# Patient Record
Sex: MALE | Race: WHITE | NOT HISPANIC OR LATINO | Employment: FULL TIME | URBAN - NONMETROPOLITAN AREA
[De-identification: names, ages, dates, MRNs, and addresses within clinical notes are randomized per-mention and may not be internally consistent; named-entity substitution may affect disease eponyms.]

---

## 2019-06-25 ENCOUNTER — OFFICE VISIT (OUTPATIENT)
Dept: FAMILY MEDICINE CLINIC | Facility: CLINIC | Age: 34
End: 2019-06-25

## 2019-06-25 VITALS
SYSTOLIC BLOOD PRESSURE: 160 MMHG | HEIGHT: 70 IN | TEMPERATURE: 98.5 F | HEART RATE: 113 BPM | BODY MASS INDEX: 22.39 KG/M2 | OXYGEN SATURATION: 99 % | WEIGHT: 156.4 LBS | DIASTOLIC BLOOD PRESSURE: 90 MMHG

## 2019-06-25 DIAGNOSIS — J30.9 ALLERGIC SHINERS: ICD-10-CM

## 2019-06-25 DIAGNOSIS — L30.9 ECZEMA, UNSPECIFIED TYPE: ICD-10-CM

## 2019-06-25 DIAGNOSIS — K21.9 GASTROESOPHAGEAL REFLUX DISEASE, ESOPHAGITIS PRESENCE NOT SPECIFIED: Primary | ICD-10-CM

## 2019-06-25 PROCEDURE — 99203 OFFICE O/P NEW LOW 30 MIN: CPT | Performed by: FAMILY MEDICINE

## 2019-06-25 RX ORDER — RANITIDINE 150 MG/1
150 TABLET ORAL 2 TIMES DAILY
Qty: 60 TABLET | Refills: 1 | Status: SHIPPED | OUTPATIENT
Start: 2019-06-25 | End: 2019-07-17 | Stop reason: SDUPTHER

## 2019-06-25 NOTE — PROGRESS NOTES
Subjective   Alan Contreras is a 33 y.o. male.   Pt presents today with CC of Establish Care; Difficulty Swallowing; and Heartburn      History of Present Illness   1.  Patient is a 33-year-old male here to establish care.  #2 he complains of daily heartburn with occasional difficulty swallowing.  He states that he feels like his food gets caught in his throat.  This happens roughly once a week, no particular food seems to get caught up, but they are always solid.  He does not take anything for reflux.  He is interested in options.  He reports moderate alcohol use as stated in social history.  He is a current everyday smoker.  His diet is poor.  #3 patient complains of dark rings under his eyes, along with allergies and occasional skin rash, particularly on his feet and legs.         The following portions of the patient's history were reviewed and updated as appropriate: allergies, current medications, past family history, past social history, past surgical history and problem list.    Review of Systems   Constitutional: Negative for chills, fever and unexpected weight loss.   HENT: Negative for congestion and sore throat.    Eyes: Negative for blurred vision and visual disturbance.   Respiratory: Negative for cough and wheezing.    Cardiovascular: Negative for chest pain and palpitations.   Gastrointestinal: Negative for abdominal pain and diarrhea.   Genitourinary: Negative for dysuria.   Musculoskeletal: Negative for arthralgias and neck stiffness.   Neurological: Negative for dizziness, seizures and syncope.   Psychiatric/Behavioral: Negative for self-injury, suicidal ideas and depressed mood.       Objective   Physical Exam   Constitutional: He is oriented to person, place, and time. He appears well-developed and well-nourished.   HENT:   Head: Normocephalic and atraumatic.   Right Ear: External ear normal.   Left Ear: External ear normal.   Nose: Nose normal.   Mouth/Throat: Oropharynx is clear and moist.    Eyes: Conjunctivae and EOM are normal. Pupils are equal, round, and reactive to light.   Neck: Normal range of motion. Neck supple.   Cardiovascular: Normal rate, regular rhythm and normal heart sounds.   Pulmonary/Chest: Effort normal and breath sounds normal.   Abdominal: Soft. Bowel sounds are normal. He exhibits no mass. There is no tenderness. There is no guarding.   Neurological: He is alert and oriented to person, place, and time.   Skin: Skin is warm and dry.   Erythematous rash over shins and ankles, not consistent with irritant dermatitis.  Papular rash in patches with dry skin, consistent with eczema   Psychiatric: He has a normal mood and affect. His behavior is normal.         Assessment/Plan   Alan was seen today for establish care, difficulty swallowing and heartburn.    Diagnoses and all orders for this visit:    Gastroesophageal reflux disease, esophagitis presence not specified  Esophagitis is possible, we will trial Zantac.  She was Zantac as he also has a problem with allergies.  If no significant improvement in 3 weeks I recommended follow-up to consider referral for EGD.  Sooner if this problem worsens.  Eczema, unspecified type  Recommended Eucerin cream.  Will consider steroid infused Eucerin if needed.  Allergic shiners  Dark circles under his eyes, perhaps some degree of sleep deprivation, though consistent with allergic shiners.  Other orders  -     raNITIdine (ZANTAC) 150 MG tablet; Take 1 tablet by mouth 2 (Two) Times a Day.                 I advised Alan of the risks of continuing to use tobacco, and I provided him with tobacco cessation educational materials in the After Visit Summary.     During this visit, I spent 3 minutes counseling the patient regarding tobacco cessation.    Patient's Body mass index is 22.44 kg/m². BMI is above normal parameters. Recommendations include: exercise counseling and nutrition counseling.

## 2019-07-17 ENCOUNTER — OFFICE VISIT (OUTPATIENT)
Dept: FAMILY MEDICINE CLINIC | Facility: CLINIC | Age: 34
End: 2019-07-17

## 2019-07-17 VITALS
BODY MASS INDEX: 22.05 KG/M2 | WEIGHT: 154 LBS | OXYGEN SATURATION: 99 % | DIASTOLIC BLOOD PRESSURE: 88 MMHG | TEMPERATURE: 98.5 F | SYSTOLIC BLOOD PRESSURE: 150 MMHG | HEART RATE: 108 BPM | HEIGHT: 70 IN

## 2019-07-17 DIAGNOSIS — F17.200 CURRENT EVERY DAY SMOKER: ICD-10-CM

## 2019-07-17 DIAGNOSIS — R13.10 DYSPHAGIA, UNSPECIFIED TYPE: ICD-10-CM

## 2019-07-17 DIAGNOSIS — I10 ESSENTIAL HYPERTENSION: ICD-10-CM

## 2019-07-17 DIAGNOSIS — K21.9 GASTROESOPHAGEAL REFLUX DISEASE, ESOPHAGITIS PRESENCE NOT SPECIFIED: Primary | ICD-10-CM

## 2019-07-17 PROCEDURE — 99214 OFFICE O/P EST MOD 30 MIN: CPT | Performed by: FAMILY MEDICINE

## 2019-07-17 PROCEDURE — 80053 COMPREHEN METABOLIC PANEL: CPT | Performed by: FAMILY MEDICINE

## 2019-07-17 PROCEDURE — 85025 COMPLETE CBC W/AUTO DIFF WBC: CPT | Performed by: FAMILY MEDICINE

## 2019-07-17 RX ORDER — RANITIDINE 150 MG/1
150 TABLET ORAL 2 TIMES DAILY
Qty: 60 TABLET | Refills: 1 | Status: SHIPPED | OUTPATIENT
Start: 2019-07-17 | End: 2020-09-02

## 2019-07-17 RX ORDER — OMEPRAZOLE 20 MG/1
20 CAPSULE, DELAYED RELEASE ORAL DAILY
Qty: 30 CAPSULE | Refills: 1 | Status: SHIPPED | OUTPATIENT
Start: 2019-07-17 | End: 2019-09-11 | Stop reason: SDUPTHER

## 2019-07-17 NOTE — PROGRESS NOTES
Subjective   Alan Contreras is a 33 y.o. male.   Pt presents today with CC of Follow-up and Heartburn      History of Present Illness   Patient is a 33-year-old male here to follow-up on heartburn, he also complains of dysphagia.  He states that he feels like food gets caught occasionally, only solids, it eventually goes down without regurgitation.  He states that ranitidine has not significantly helped his symptoms.  He would like further evaluation.  He denies dark tarry stools.  He currently takes ranitidine 150 mg twice a day.  He states that it is not helping significantly.         The following portions of the patient's history were reviewed and updated as appropriate: allergies, current medications, past family history, past social history, past surgical history and problem list.    Review of Systems   Constitutional: Negative for chills, fever and unexpected weight loss.   HENT: Negative for congestion and sore throat.    Eyes: Negative for blurred vision and visual disturbance.   Respiratory: Negative for cough and wheezing.    Cardiovascular: Negative for chest pain and palpitations.   Gastrointestinal: Negative for abdominal pain, blood in stool, constipation and diarrhea.   Endocrine: Negative for cold intolerance and heat intolerance.   Genitourinary: Negative for dysuria.   Musculoskeletal: Negative for arthralgias and neck stiffness.   Neurological: Negative for dizziness, seizures and syncope.   Psychiatric/Behavioral: Negative for self-injury, suicidal ideas and depressed mood.       Objective   Physical Exam   Constitutional: He is oriented to person, place, and time. He appears well-developed and well-nourished.   HENT:   Head: Normocephalic and atraumatic.   Eyes: Conjunctivae and EOM are normal. Pupils are equal, round, and reactive to light.   Neck: Normal range of motion. Neck supple.   Cardiovascular: Normal rate, regular rhythm and normal heart sounds.   Pulmonary/Chest: Effort normal and  breath sounds normal.   Abdominal: Soft. Bowel sounds are normal. He exhibits no distension. There is no tenderness. There is no guarding.   Neurological: He is alert and oriented to person, place, and time.   Skin: Skin is warm and dry.   Psychiatric: He has a normal mood and affect. His behavior is normal.         Assessment/Plan   Alan was seen today for follow-up and heartburn.    Diagnoses and all orders for this visit:    Gastroesophageal reflux disease, esophagitis presence not specified  -     omeprazole (PRILOSEC) 20 MG capsule; Take 1 capsule by mouth Daily.  -     raNITIdine (ZANTAC) 150 MG tablet; Take 1 tablet by mouth 2 (Two) Times a Day.  -     Ambulatory Referral to General Surgery  -     CBC Auto Differential; Future  -     Comprehensive Metabolic Panel; Future  -     CBC Auto Differential  -     Comprehensive Metabolic Panel  Will start omeprazole to go along with ranitidine area and the side effects of these medications were reviewed, he was agreeable to proceed.  Dysphagia, unspecified type  -     Ambulatory Referral to General Surgery  -     CBC Auto Differential; Future  -     Comprehensive Metabolic Panel; Future  -     CBC Auto Differential  -     Comprehensive Metabolic Panel  Recommend discussing his symptoms with his surgeon or GI specialist.  Patient agreeable.  Will get evaluation.    Essential hypertension  His blood pressure has been persistently high.  He states that this is due to anxiety.  Will get a diagnosis and treatment plan for his dysphagia and GERD prior to starting antihypertensives.  Current every day smoker               We discussed smoking cessation for 3 minutes.  He has no interest in quitting.      Patient's Body mass index is 22.1 kg/m². BMI is above normal parameters. Recommendations include: exercise counseling and nutrition counseling.

## 2019-07-18 ENCOUNTER — TELEPHONE (OUTPATIENT)
Dept: FAMILY MEDICINE CLINIC | Facility: CLINIC | Age: 34
End: 2019-07-18

## 2019-07-18 LAB
ALBUMIN SERPL-MCNC: 4.6 G/DL (ref 3.5–5.2)
ALBUMIN/GLOB SERPL: 1.4 G/DL
ALP SERPL-CCNC: 58 U/L (ref 39–117)
ALT SERPL W P-5'-P-CCNC: 24 U/L (ref 1–41)
ANION GAP SERPL CALCULATED.3IONS-SCNC: 12.3 MMOL/L (ref 5–15)
AST SERPL-CCNC: 29 U/L (ref 1–40)
BASOPHILS # BLD AUTO: 0.09 10*3/MM3 (ref 0–0.2)
BASOPHILS NFR BLD AUTO: 1.5 % (ref 0–1.5)
BILIRUB SERPL-MCNC: 0.5 MG/DL (ref 0.2–1.2)
BUN BLD-MCNC: 9 MG/DL (ref 6–20)
BUN/CREAT SERPL: 10.2 (ref 7–25)
CALCIUM SPEC-SCNC: 10 MG/DL (ref 8.6–10.5)
CHLORIDE SERPL-SCNC: 100 MMOL/L (ref 98–107)
CO2 SERPL-SCNC: 26.7 MMOL/L (ref 22–29)
CREAT BLD-MCNC: 0.88 MG/DL (ref 0.76–1.27)
DEPRECATED RDW RBC AUTO: 45.6 FL (ref 37–54)
EOSINOPHIL # BLD AUTO: 0.26 10*3/MM3 (ref 0–0.4)
EOSINOPHIL NFR BLD AUTO: 4.3 % (ref 0.3–6.2)
ERYTHROCYTE [DISTWIDTH] IN BLOOD BY AUTOMATED COUNT: 12.8 % (ref 12.3–15.4)
GFR SERPL CREATININE-BSD FRML MDRD: 99 ML/MIN/1.73
GLOBULIN UR ELPH-MCNC: 3.2 GM/DL
GLUCOSE BLD-MCNC: 89 MG/DL (ref 65–99)
HCT VFR BLD AUTO: 48 % (ref 37.5–51)
HGB BLD-MCNC: 15.7 G/DL (ref 13–17.7)
IMM GRANULOCYTES # BLD AUTO: 0.02 10*3/MM3 (ref 0–0.05)
IMM GRANULOCYTES NFR BLD AUTO: 0.3 % (ref 0–0.5)
LYMPHOCYTES # BLD AUTO: 1.62 10*3/MM3 (ref 0.7–3.1)
LYMPHOCYTES NFR BLD AUTO: 26.7 % (ref 19.6–45.3)
MCH RBC QN AUTO: 31.5 PG (ref 26.6–33)
MCHC RBC AUTO-ENTMCNC: 32.7 G/DL (ref 31.5–35.7)
MCV RBC AUTO: 96.4 FL (ref 79–97)
MONOCYTES # BLD AUTO: 0.49 10*3/MM3 (ref 0.1–0.9)
MONOCYTES NFR BLD AUTO: 8.1 % (ref 5–12)
NEUTROPHILS # BLD AUTO: 3.58 10*3/MM3 (ref 1.7–7)
NEUTROPHILS NFR BLD AUTO: 59.1 % (ref 42.7–76)
NRBC BLD AUTO-RTO: 0 /100 WBC (ref 0–0.2)
PLATELET # BLD AUTO: 244 10*3/MM3 (ref 140–450)
PMV BLD AUTO: 11.8 FL (ref 6–12)
POTASSIUM BLD-SCNC: 5 MMOL/L (ref 3.5–5.2)
PROT SERPL-MCNC: 7.8 G/DL (ref 6–8.5)
RBC # BLD AUTO: 4.98 10*6/MM3 (ref 4.14–5.8)
SODIUM BLD-SCNC: 139 MMOL/L (ref 136–145)
WBC NRBC COR # BLD: 6.06 10*3/MM3 (ref 3.4–10.8)

## 2019-07-18 NOTE — TELEPHONE ENCOUNTER
----- Message from Rayray Telles DO sent at 7/18/2019 11:02 AM EDT -----  Please send a letter.  Labs were all normal.  Please keep your follow-up with general surgery.      Stable letter mailed.

## 2019-07-25 ENCOUNTER — OFFICE VISIT (OUTPATIENT)
Dept: SURGERY | Facility: CLINIC | Age: 34
End: 2019-07-25

## 2019-07-25 VITALS
WEIGHT: 149.2 LBS | SYSTOLIC BLOOD PRESSURE: 146 MMHG | DIASTOLIC BLOOD PRESSURE: 104 MMHG | HEIGHT: 70 IN | BODY MASS INDEX: 21.36 KG/M2

## 2019-07-25 DIAGNOSIS — K21.9 GASTROESOPHAGEAL REFLUX DISEASE, ESOPHAGITIS PRESENCE NOT SPECIFIED: Primary | ICD-10-CM

## 2019-07-25 PROCEDURE — 99203 OFFICE O/P NEW LOW 30 MIN: CPT | Performed by: SURGERY

## 2019-07-25 NOTE — PROGRESS NOTES
Subjective   Alan Contreras is a 33 y.o. male.     History of Present Illness He has had some difficulty swallowing bread and pasta. It seems to catch in his upper esophagus. He has reflux symptoms often and is on prilosec but it has not helped the swallowing. No prior EGD.     The following portions of the patient's history were reviewed and updated as appropriate: current medications, past family history, past medical history, past social history, past surgical history and problem list.    Review of Systems   Constitutional: Negative for activity change, appetite change, chills, fever and unexpected weight change.   HENT: Positive for trouble swallowing. Negative for congestion, facial swelling and sore throat.    Eyes: Negative for photophobia and visual disturbance.   Respiratory: Negative for chest tightness, shortness of breath and wheezing.    Cardiovascular: Negative for chest pain, palpitations and leg swelling.   Gastrointestinal: Negative for abdominal distention, abdominal pain, anal bleeding, blood in stool, constipation, diarrhea, nausea, rectal pain and vomiting.   Endocrine: Negative for cold intolerance, heat intolerance, polydipsia and polyuria.   Genitourinary: Negative for difficulty urinating, dysuria, flank pain and urgency.   Musculoskeletal: Negative for back pain and myalgias.   Skin: Negative for rash and wound.   Allergic/Immunologic: Negative for immunocompromised state.   Neurological: Negative for dizziness, seizures, syncope, light-headedness, numbness and headaches.   Hematological: Negative for adenopathy. Does not bruise/bleed easily.   Psychiatric/Behavioral: Negative for behavioral problems and confusion. The patient is not nervous/anxious.        Objective   Physical Exam   Constitutional: He is oriented to person, place, and time. He appears well-developed and well-nourished. He does not appear ill. No distress.   HENT:   Head: Normocephalic. Head is without laceration. Hair is  normal.   Right Ear: Hearing and ear canal normal.   Left Ear: Hearing and ear canal normal.   Nose: Nose normal. No sinus tenderness. No epistaxis. Right sinus exhibits no maxillary sinus tenderness and no frontal sinus tenderness. Left sinus exhibits no maxillary sinus tenderness and no frontal sinus tenderness.   Eyes: Conjunctivae and lids are normal. Pupils are equal, round, and reactive to light.   Neck: Normal range of motion. No JVD present. No tracheal tenderness present. No tracheal deviation present. No thyroid mass and no thyromegaly present.   Cardiovascular: Normal rate and regular rhythm. Exam reveals no gallop.   No murmur heard.  Pulmonary/Chest: Effort normal and breath sounds normal. No stridor. He has no wheezes. He exhibits no tenderness.   Abdominal: Soft. Bowel sounds are normal. He exhibits no distension, no ascites and no mass. There is no tenderness. There is no rebound and no guarding. No hernia.   Musculoskeletal: He exhibits no edema or deformity.   Lymphadenopathy:     He has no cervical adenopathy.     He has no axillary adenopathy.        Right: No inguinal and no supraclavicular adenopathy present.        Left: No inguinal and no supraclavicular adenopathy present.   Neurological: He is alert and oriented to person, place, and time. He exhibits normal muscle tone.   Skin: Skin is warm, dry and intact. No rash noted. No erythema. No pallor.   Psychiatric: He has a normal mood and affect. His behavior is normal. Thought content normal.   Vitals reviewed.      Assessment/Plan   Alan was seen today for difficulty swallowing.    Diagnoses and all orders for this visit:    Gastroesophageal reflux disease, esophagitis presence not specified    dysphagia, will do EGD and possible dilation.

## 2019-08-08 DIAGNOSIS — K21.9 GASTROESOPHAGEAL REFLUX DISEASE, ESOPHAGITIS PRESENCE NOT SPECIFIED: ICD-10-CM

## 2019-08-08 RX ORDER — OMEPRAZOLE 20 MG/1
CAPSULE, DELAYED RELEASE ORAL
Qty: 30 CAPSULE | Refills: 1 | OUTPATIENT
Start: 2019-08-08

## 2019-08-19 ENCOUNTER — OFFICE VISIT (OUTPATIENT)
Dept: FAMILY MEDICINE CLINIC | Facility: CLINIC | Age: 34
End: 2019-08-19

## 2019-08-19 VITALS
HEIGHT: 70 IN | OXYGEN SATURATION: 98 % | WEIGHT: 151.4 LBS | BODY MASS INDEX: 21.67 KG/M2 | DIASTOLIC BLOOD PRESSURE: 86 MMHG | SYSTOLIC BLOOD PRESSURE: 136 MMHG | TEMPERATURE: 98.7 F | HEART RATE: 106 BPM

## 2019-08-19 DIAGNOSIS — H92.02 LEFT EAR PAIN: ICD-10-CM

## 2019-08-19 DIAGNOSIS — L08.9 INFECTED EPITHELIAL INCLUSION CYST: Primary | ICD-10-CM

## 2019-08-19 DIAGNOSIS — L72.0 INFECTED EPITHELIAL INCLUSION CYST: Primary | ICD-10-CM

## 2019-08-19 PROCEDURE — 96372 THER/PROPH/DIAG INJ SC/IM: CPT | Performed by: FAMILY MEDICINE

## 2019-08-19 PROCEDURE — 99214 OFFICE O/P EST MOD 30 MIN: CPT | Performed by: FAMILY MEDICINE

## 2019-08-19 RX ORDER — CEFTRIAXONE 500 MG/1
1 INJECTION, POWDER, FOR SOLUTION INTRAMUSCULAR; INTRAVENOUS ONCE
Status: COMPLETED | OUTPATIENT
Start: 2019-08-19 | End: 2019-08-19

## 2019-08-19 RX ORDER — CEPHALEXIN 500 MG/1
500 CAPSULE ORAL 2 TIMES DAILY
Qty: 18 CAPSULE | Refills: 0 | Status: SHIPPED | OUTPATIENT
Start: 2019-08-19 | End: 2019-10-04

## 2019-08-19 RX ADMIN — CEFTRIAXONE 1 G: 500 INJECTION, POWDER, FOR SOLUTION INTRAMUSCULAR; INTRAVENOUS at 10:45

## 2019-08-19 NOTE — PROGRESS NOTES
Subjective   Alan Contreras is a 33 y.o. male.   Pt presents today with CC of Earache (left ear)      History of Present Illness   Patient is a 33-year-old male here complaining of left ear pain.  He has a history of dermal inclusion cysts that has historically become infected.  He has 2 large cysts near his left ear, one directly behind, the other anterior to his tragus that is compressing his ear canal.  These have popped up over the past several days.  Historically he has had this problem before, they spontaneously drained many times.  He denies fevers or chills.  He states that he can only hear out of his left ear if he pulls his helix posteriorly to make room.         The following portions of the patient's history were reviewed and updated as appropriate: allergies, current medications, past family history, past social history, past surgical history and problem list.    Review of Systems   Constitutional: Negative for chills, fever and unexpected weight loss.   HENT: Positive for hearing loss. Negative for congestion and sore throat.    Eyes: Negative for blurred vision and visual disturbance.   Respiratory: Negative for cough and wheezing.    Cardiovascular: Negative for chest pain and palpitations.   Gastrointestinal: Negative for abdominal pain and diarrhea.   Endocrine: Negative for cold intolerance and heat intolerance.   Genitourinary: Negative for dysuria.   Musculoskeletal: Negative for arthralgias and neck stiffness.   Skin: Positive for skin lesions.   Neurological: Negative for dizziness, seizures and syncope.   Psychiatric/Behavioral: Negative for self-injury, suicidal ideas and depressed mood.       Objective   Physical Exam   Constitutional: He is oriented to person, place, and time. He appears well-developed and well-nourished.   HENT:   Head: Normocephalic and atraumatic.   Right Ear: External ear normal.   Nose: Nose normal.   Mouth/Throat: Oropharynx is clear and moist.   Anterior to his tragus  is a marble sized moderately tender cyst, it is compressing his ear canal.  A slightly larger cyst is found behind his ear, it is soft, there is no papule on top.  Less tender.  There is moderate erythema around both.  No lymphadenopathy noted.   Eyes: Conjunctivae are normal.   Neck: Normal range of motion. Neck supple. No thyromegaly present.   Cardiovascular: Normal rate and regular rhythm.   Pulmonary/Chest: Effort normal and breath sounds normal.   Lymphadenopathy:     He has no cervical adenopathy.   Neurological: He is alert and oriented to person, place, and time.   Nursing note and vitals reviewed.        Assessment/Plan   Alan was seen today for earache.    Diagnoses and all orders for this visit:    Infected epithelial inclusion cyst  -     cefTRIAXone (ROCEPHIN) injection 1 g  He has no signs of sepsis.  We will treat with 1 g of Rocephin now, he is to start 500 mg twice a day of Keflex starting tomorrow.  Duration 9 days, he has no known history of MRSA.  If his condition worsens we will need to do incision and drainage, at this point the risk outweighs the benefit in my opinion.  He is agreeable to plan.  In the long-term he may benefit from dermatology referral.  NSAIDs as needed for pain.  Left ear pain  -     cephalexin (KEFLEX) 500 MG capsule; Take 1 capsule by mouth 2 (Two) Times a Day. Start 8/20/19

## 2019-08-26 RX ORDER — RANITIDINE 150 MG/1
TABLET ORAL
Qty: 60 TABLET | Refills: 1 | Status: SHIPPED | OUTPATIENT
Start: 2019-08-26 | End: 2019-10-04

## 2019-09-11 DIAGNOSIS — K21.9 GASTROESOPHAGEAL REFLUX DISEASE, ESOPHAGITIS PRESENCE NOT SPECIFIED: ICD-10-CM

## 2019-09-11 RX ORDER — OMEPRAZOLE 20 MG/1
20 CAPSULE, DELAYED RELEASE ORAL DAILY
Qty: 30 CAPSULE | Refills: 1 | Status: SHIPPED | OUTPATIENT
Start: 2019-09-11 | End: 2019-10-20 | Stop reason: SDUPTHER

## 2019-10-04 ENCOUNTER — OFFICE VISIT (OUTPATIENT)
Dept: FAMILY MEDICINE CLINIC | Facility: CLINIC | Age: 34
End: 2019-10-04

## 2019-10-04 VITALS
TEMPERATURE: 98.3 F | HEIGHT: 70 IN | BODY MASS INDEX: 21.7 KG/M2 | OXYGEN SATURATION: 99 % | DIASTOLIC BLOOD PRESSURE: 84 MMHG | HEART RATE: 84 BPM | WEIGHT: 151.6 LBS | SYSTOLIC BLOOD PRESSURE: 122 MMHG

## 2019-10-04 DIAGNOSIS — H92.02 EAR PAIN, LEFT: ICD-10-CM

## 2019-10-04 DIAGNOSIS — L72.0 EIC (EPIDERMAL INCLUSION CYST): Primary | ICD-10-CM

## 2019-10-04 PROCEDURE — 99213 OFFICE O/P EST LOW 20 MIN: CPT | Performed by: FAMILY MEDICINE

## 2019-10-04 RX ORDER — CEPHALEXIN 500 MG/1
500 CAPSULE ORAL 2 TIMES DAILY
Qty: 20 CAPSULE | Refills: 0 | Status: SHIPPED | OUTPATIENT
Start: 2019-10-04 | End: 2020-09-02

## 2019-10-04 NOTE — PROGRESS NOTES
Subjective   Alan Contreras is a 33 y.o. male.   Pt presents today with CC of Earache (cyst in left ear)      History of Present Illness   Patient is a 33-year-old male who is here complaining of a cyst in his left ear.  He was diagnosed in the past with an epidermal inclusion cyst on his left tragus, when this enlarges it partially occludes his ear canal and is uncomfortable.  In the past he did well with antibiotics, it is not been a problem since then.  He reports that yesterday it swelled up and again he had problems with occlusion of his ear canal, today he reports that the little better though would like to pursue treatment.  He requests treatment of the cyst.         The following portions of the patient's history were reviewed and updated as appropriate: allergies, current medications, past family history, past social history, past surgical history and problem list.    Review of Systems   Constitutional: Negative for chills, fever and unexpected weight loss.   HENT: Negative for congestion and sore throat.    Eyes: Negative for blurred vision and visual disturbance.   Respiratory: Negative for cough and wheezing.    Cardiovascular: Negative for chest pain and palpitations.   Gastrointestinal: Negative for abdominal pain and diarrhea.   Genitourinary: Negative for dysuria.   Musculoskeletal: Negative for arthralgias and neck stiffness.   Skin: Positive for skin lesions.   Neurological: Negative for dizziness, seizures and syncope.   Psychiatric/Behavioral: Negative for self-injury, suicidal ideas and depressed mood.       Objective   Physical Exam   Constitutional: He is oriented to person, place, and time. He appears well-developed and well-nourished.   HENT:   Head: Normocephalic and atraumatic.   Left ear tragus is slightly swollen, minimally erythematous, mildly tenderness, cysti noted, approximately size of a pea, it is partially occluding the ear canal, when the skin is  to view the tympanic  membrane, it is clear that adhesion is trying to form superiorly from chronic approximation.  Ear exam was otherwise normal.   Eyes: Conjunctivae and EOM are normal. Pupils are equal, round, and reactive to light.   Neck: Normal range of motion. Neck supple.   Cardiovascular: Normal rate, regular rhythm and normal heart sounds.   Pulmonary/Chest: Effort normal and breath sounds normal.   Abdominal: Soft. Bowel sounds are normal.   Neurological: He is alert and oriented to person, place, and time.   Skin: Skin is warm and dry.   Psychiatric: He has a normal mood and affect. His behavior is normal.   Nursing note and vitals reviewed.        Assessment/Plan   Alan was seen today for earache.    Diagnoses and all orders for this visit:    EIC (epidermal inclusion cyst)  -     Ambulatory Referral to Dermatology  -     cephalexin (KEFLEX) 500 MG capsule; Take 1 capsule by mouth 2 (Two) Times a Day.  We will treat with antibiotic and refer him to dermatology.  The location of the cyst requires specialist treatment.  He is agreeable to plan.  If he has any problems with the antibiotic he is to call.  Ear pain, left  -     cephalexin (KEFLEX) 500 MG capsule; Take 1 capsule by mouth 2 (Two) Times a Day.                 Patient's Body mass index is 21.75 kg/m². BMI is above normal parameters. Recommendations include: exercise counseling and nutrition counseling.

## 2019-10-20 DIAGNOSIS — K21.9 GASTROESOPHAGEAL REFLUX DISEASE, ESOPHAGITIS PRESENCE NOT SPECIFIED: ICD-10-CM

## 2019-10-21 RX ORDER — OMEPRAZOLE 20 MG/1
CAPSULE, DELAYED RELEASE ORAL
Qty: 30 CAPSULE | Refills: 3 | Status: SHIPPED | OUTPATIENT
Start: 2019-10-21 | End: 2020-09-02 | Stop reason: SDUPTHER

## 2020-05-30 DIAGNOSIS — K21.9 GASTROESOPHAGEAL REFLUX DISEASE, ESOPHAGITIS PRESENCE NOT SPECIFIED: ICD-10-CM

## 2020-06-01 RX ORDER — OMEPRAZOLE 20 MG/1
CAPSULE, DELAYED RELEASE ORAL
Qty: 90 CAPSULE | Refills: 1 | OUTPATIENT
Start: 2020-06-01

## 2020-09-02 ENCOUNTER — OFFICE VISIT (OUTPATIENT)
Dept: FAMILY MEDICINE CLINIC | Facility: CLINIC | Age: 35
End: 2020-09-02

## 2020-09-02 VITALS
TEMPERATURE: 97.1 F | HEART RATE: 94 BPM | WEIGHT: 153 LBS | OXYGEN SATURATION: 98 % | HEIGHT: 70 IN | DIASTOLIC BLOOD PRESSURE: 84 MMHG | BODY MASS INDEX: 21.9 KG/M2 | SYSTOLIC BLOOD PRESSURE: 120 MMHG

## 2020-09-02 DIAGNOSIS — K21.9 GASTROESOPHAGEAL REFLUX DISEASE, ESOPHAGITIS PRESENCE NOT SPECIFIED: ICD-10-CM

## 2020-09-02 DIAGNOSIS — F41.9 ANXIETY: Primary | ICD-10-CM

## 2020-09-02 PROCEDURE — 99213 OFFICE O/P EST LOW 20 MIN: CPT | Performed by: FAMILY MEDICINE

## 2020-09-02 RX ORDER — OMEPRAZOLE 20 MG/1
20 CAPSULE, DELAYED RELEASE ORAL DAILY
Qty: 90 CAPSULE | Refills: 1 | Status: SHIPPED | OUTPATIENT
Start: 2020-09-02 | End: 2021-04-13

## 2020-09-02 RX ORDER — HYDROXYZINE HYDROCHLORIDE 25 MG/1
25 TABLET, FILM COATED ORAL EVERY 4 HOURS PRN
Qty: 90 TABLET | Refills: 1 | Status: SHIPPED | OUTPATIENT
Start: 2020-09-02 | End: 2020-09-09

## 2020-09-02 NOTE — PROGRESS NOTES
"Gemma Contreras is a 34 y.o. male.   Pt presents today with CC of Anxiety      History of Present Illness   1.  Patient is here today complaining of anxiety.  He has anxiety on a daily basis.  He reports that lifestyle changes since the beginning of COVID restrictions have worsened his anxiety, including having the kids not going to school and him having to help with that in the evening.  He is not interested in daily medication such as SSRIs as he \"has heard bad things about Prozac\".  He would like to take something as needed.  He is not interested in counseling.  2.  Patient is here to follow-up on GERD.  He takes Prilosec 20 mg as needed.  He reports that he takes it a few days at a time, following a antireflux diet has been helpful.       The following portions of the patient's history were reviewed and updated as appropriate: allergies, current medications, past family history, past medical history, past social history, past surgical history and problem list.    Review of Systems   Constitutional: Negative for chills, fever and unexpected weight loss.   HENT: Negative for congestion and sore throat.    Eyes: Negative for blurred vision and visual disturbance.   Respiratory: Negative for cough and wheezing.    Cardiovascular: Negative for chest pain and palpitations.   Gastrointestinal: Negative for abdominal pain and diarrhea.   Endocrine: Negative for cold intolerance and heat intolerance.   Genitourinary: Negative for dysuria.   Musculoskeletal: Negative for arthralgias and neck stiffness.   Neurological: Negative for dizziness, seizures and syncope.   Psychiatric/Behavioral: Negative for self-injury, suicidal ideas and depressed mood. The patient is nervous/anxious.        Objective   Physical Exam   Constitutional: He is oriented to person, place, and time. He appears well-developed and well-nourished.   HENT:   Head: Normocephalic and atraumatic.   Nose: Nose normal.   Mouth/Throat: Oropharynx is " clear and moist.   Eyes: Pupils are equal, round, and reactive to light. Conjunctivae and EOM are normal.   Cardiovascular: Normal rate, regular rhythm and normal heart sounds.   Pulmonary/Chest: Effort normal and breath sounds normal.   Abdominal: Soft. Bowel sounds are normal.   Neurological: He is alert and oriented to person, place, and time.   Skin: Skin is warm and dry.   Psychiatric: He has a normal mood and affect. His behavior is normal.   Nursing note and vitals reviewed.        Assessment/Plan   Alan was seen today for anxiety.    Diagnoses and all orders for this visit:    Anxiety  -     hydrOXYzine (ATARAX) 25 MG tablet; Take 1 tablet by mouth Every 4 (Four) Hours As Needed for Anxiety.  We discussed options.  Will start hydroxyzine 25 mg every 4 hours, or 50 mg every 8 hours as needed for anxiety.  The side effects of this medication were discussed and he was agreeable to proceed.  We will have him follow-up in 2 months, sooner if needed.  Gastroesophageal reflux disease, esophagitis presence not specified  -     omeprazole (priLOSEC) 20 MG capsule; Take 1 capsule by mouth Daily.  Discontinue Zantac from his medication list because of recalls.  If he has more problems with GERD, we will consider increasing omeprazole to 40 mg.  Current everyday smoker    He has no interest in quitting.         Alan Contreras  reports that he has been smoking cigarettes. He has a 20.00 pack-year smoking history. His smokeless tobacco use includes snuff.. I have educated him on the risk of diseases from using tobacco products such as cancer, COPD and heart diease.     I advised him to quit and he is not willing to quit.    I spent 3  minutes counseling the patient.

## 2020-09-09 DIAGNOSIS — F41.9 ANXIETY: ICD-10-CM

## 2020-09-09 RX ORDER — HYDROXYZINE HYDROCHLORIDE 25 MG/1
25 TABLET, FILM COATED ORAL EVERY 4 HOURS PRN
Qty: 90 TABLET | Refills: 1 | Status: SHIPPED | OUTPATIENT
Start: 2020-09-09 | End: 2020-10-01 | Stop reason: SDUPTHER

## 2020-10-01 DIAGNOSIS — F41.9 ANXIETY: ICD-10-CM

## 2020-10-01 RX ORDER — HYDROXYZINE HYDROCHLORIDE 25 MG/1
25 TABLET, FILM COATED ORAL EVERY 4 HOURS PRN
Qty: 90 TABLET | Refills: 0 | Status: SHIPPED | OUTPATIENT
Start: 2020-10-01 | End: 2022-12-22

## 2020-11-02 ENCOUNTER — OFFICE VISIT (OUTPATIENT)
Dept: FAMILY MEDICINE CLINIC | Facility: CLINIC | Age: 35
End: 2020-11-02

## 2020-11-02 VITALS
SYSTOLIC BLOOD PRESSURE: 130 MMHG | HEART RATE: 107 BPM | BODY MASS INDEX: 22.42 KG/M2 | WEIGHT: 156.6 LBS | TEMPERATURE: 97.6 F | OXYGEN SATURATION: 99 % | HEIGHT: 70 IN | DIASTOLIC BLOOD PRESSURE: 86 MMHG

## 2020-11-02 DIAGNOSIS — R68.82 DECREASED LIBIDO: ICD-10-CM

## 2020-11-02 DIAGNOSIS — Z00.00 ANNUAL PHYSICAL EXAM: Primary | ICD-10-CM

## 2020-11-02 DIAGNOSIS — R53.83 FATIGUE, UNSPECIFIED TYPE: ICD-10-CM

## 2020-11-02 DIAGNOSIS — F41.9 ANXIETY: ICD-10-CM

## 2020-11-02 DIAGNOSIS — Z28.21 INFLUENZA VACCINE REFUSED: ICD-10-CM

## 2020-11-02 PROCEDURE — 99395 PREV VISIT EST AGE 18-39: CPT | Performed by: FAMILY MEDICINE

## 2020-11-02 PROCEDURE — 85025 COMPLETE CBC W/AUTO DIFF WBC: CPT | Performed by: FAMILY MEDICINE

## 2020-11-02 PROCEDURE — 84402 ASSAY OF FREE TESTOSTERONE: CPT | Performed by: FAMILY MEDICINE

## 2020-11-02 PROCEDURE — 80061 LIPID PANEL: CPT | Performed by: FAMILY MEDICINE

## 2020-11-02 PROCEDURE — 80053 COMPREHEN METABOLIC PANEL: CPT | Performed by: FAMILY MEDICINE

## 2020-11-02 PROCEDURE — 84403 ASSAY OF TOTAL TESTOSTERONE: CPT | Performed by: FAMILY MEDICINE

## 2020-11-02 PROCEDURE — 84443 ASSAY THYROID STIM HORMONE: CPT | Performed by: FAMILY MEDICINE

## 2020-11-02 NOTE — PROGRESS NOTES
Gemma Contreras is a 34 y.o. male.   Pt presents today with CC of Anxiety      History of Present Illness   1.  Patient is here for annual physical exam.  #2 he reports fatigue and loss of libido in recent months.  He would like to take something for erectile dysfunction.  He is able to achieve and maintain an erection sometimes, other times not.  He has lost desire for sexual interaction.  He would like to have his labs checked.  He denies chest pain, trauma, or headaches.  #3 he is here today to follow-up on anxiety.  He has been taking hydroxyzine.  He reports this medication only helps a little but he denies side effects.       The following portions of the patient's history were reviewed and updated as appropriate: allergies, current medications, past family history, past medical history, past social history, past surgical history and problem list.    Review of Systems   Constitutional: Positive for fatigue. Negative for chills, fever and unexpected weight loss.   HENT: Negative for congestion and sore throat.    Eyes: Negative for blurred vision and visual disturbance.   Respiratory: Negative for cough and wheezing.    Cardiovascular: Negative for chest pain and palpitations.   Gastrointestinal: Negative for abdominal pain and diarrhea.   Endocrine: Negative for cold intolerance and heat intolerance.   Genitourinary: Positive for decreased libido and erectile dysfunction. Negative for dysuria.   Musculoskeletal: Negative for arthralgias and neck stiffness.   Neurological: Negative for dizziness, seizures and syncope.   Psychiatric/Behavioral: Negative for self-injury, suicidal ideas and depressed mood. The patient is nervous/anxious.        Objective   Physical Exam  Vitals signs and nursing note reviewed.   Constitutional:       Appearance: He is well-developed.   HENT:      Head: Normocephalic and atraumatic.      Right Ear: External ear normal.      Left Ear: External ear normal.      Nose: Nose  normal.   Eyes:      Conjunctiva/sclera: Conjunctivae normal.      Pupils: Pupils are equal, round, and reactive to light.   Neck:      Musculoskeletal: Normal range of motion and neck supple.   Cardiovascular:      Rate and Rhythm: Normal rate and regular rhythm.      Heart sounds: Normal heart sounds.   Pulmonary:      Effort: Pulmonary effort is normal.      Breath sounds: Normal breath sounds.   Abdominal:      General: Bowel sounds are normal.      Palpations: Abdomen is soft.   Genitourinary:     Comments: He preferred not to be examined.  Skin:     General: Skin is warm and dry.   Neurological:      Mental Status: He is alert and oriented to person, place, and time.   Psychiatric:         Behavior: Behavior normal.           Assessment/Plan   Diagnoses and all orders for this visit:    1. Annual physical exam (Primary)  -     Comprehensive Metabolic Panel; Future  -     CBC Auto Differential; Future  -     Lipid Panel; Future  -     TSH; Future  -     Testosterone, Free, Total; Future  -     Comprehensive Metabolic Panel  -     CBC Auto Differential  -     Lipid Panel  -     TSH  -     Testosterone, Free, Total  Patient Counseling:  --Nutrition: Stressed importance of moderation in sodium/caffeine intake, saturated fat and cholesterol, caloric balance, sufficient intake of fresh fruits, vegetables, fiber, calcium, iron, --Discussed the issue of estrogen replacement, calcium supplement, and the daily use of baby aspirin.  --Exercise: Stressed the importance of regular exercise.   --Substance Abuse: Discussed cessation/primary prevention of tobacco, alcohol, or other drug use; driving or other dangerous activities under the influence; availability of treatment for abuse.    --Sexuality: Discussed sexually transmitted diseases, partner selection, use of condoms, avoidance of unintended pregnancy  and contraceptive alternatives.   --Injury prevention: Discussed safety belts, safety helmets, smoke detector,  smoking near bedding or upholstery.   --Dental health: Discussed importance of regular tooth brushing, flossing, and dental visits.  --Immunizations reviewed.  --Discussed benefits of screening colonoscopy.  --After hours service discussed with patient    2. Influenza vaccine refused  He does not want a flu shot today.  3. Fatigue, unspecified type  -     Comprehensive Metabolic Panel; Future  -     CBC Auto Differential; Future  -     Lipid Panel; Future  -     TSH; Future  -     Testosterone, Free, Total; Future  -     Comprehensive Metabolic Panel  -     CBC Auto Differential  -     Lipid Panel  -     TSH  -     Testosterone, Free, Total  Prescription for 50 mg Viagra troches were printed and sent with him.  The side effects of Viagra were discussed including priapism.  He is can go to the emergency room for an erection lasting greater than 4 hours.  The risk of taking this medication along with nitrates were discussed.  He was agreeable to proceed.  I recommend taking half a 50 mg becky for a total of 25 mg to start with to see if it is effective.  He is to continue to use the lowest dose that is effective.  4. Decreased libido  -     TSH; Future  -     Testosterone, Free, Total; Future  -     TSH  -     Testosterone, Free, Total    5. Anxiety    We will continue current treatment.  Follow-up in a few weeks for lab review.             Alan Contreras  reports that he has been smoking cigarettes. He has a 20.00 pack-year smoking history. His smokeless tobacco use includes snuff.. I have educated him on the risk of diseases from using tobacco products such as cancer, COPD and heart disease.     I advised him to quit and he is not willing to quit.    I spent 3  minutes counseling the patient.

## 2020-11-03 LAB
ALBUMIN SERPL-MCNC: 4.3 G/DL (ref 3.5–5.2)
ALBUMIN/GLOB SERPL: 1.6 G/DL
ALP SERPL-CCNC: 62 U/L (ref 39–117)
ALT SERPL W P-5'-P-CCNC: 18 U/L (ref 1–41)
ANION GAP SERPL CALCULATED.3IONS-SCNC: 10.7 MMOL/L (ref 5–15)
AST SERPL-CCNC: 21 U/L (ref 1–40)
BASOPHILS # BLD AUTO: 0.07 10*3/MM3 (ref 0–0.2)
BASOPHILS NFR BLD AUTO: 1 % (ref 0–1.5)
BILIRUB SERPL-MCNC: 0.2 MG/DL (ref 0–1.2)
BUN SERPL-MCNC: 8 MG/DL (ref 6–20)
BUN/CREAT SERPL: 9.8 (ref 7–25)
CALCIUM SPEC-SCNC: 9.1 MG/DL (ref 8.6–10.5)
CHLORIDE SERPL-SCNC: 106 MMOL/L (ref 98–107)
CHOLEST SERPL-MCNC: 111 MG/DL (ref 0–200)
CO2 SERPL-SCNC: 22.3 MMOL/L (ref 22–29)
CREAT SERPL-MCNC: 0.82 MG/DL (ref 0.76–1.27)
DEPRECATED RDW RBC AUTO: 43.2 FL (ref 37–54)
EOSINOPHIL # BLD AUTO: 0.25 10*3/MM3 (ref 0–0.4)
EOSINOPHIL NFR BLD AUTO: 3.7 % (ref 0.3–6.2)
ERYTHROCYTE [DISTWIDTH] IN BLOOD BY AUTOMATED COUNT: 12.8 % (ref 12.3–15.4)
GFR SERPL CREATININE-BSD FRML MDRD: 107 ML/MIN/1.73
GLOBULIN UR ELPH-MCNC: 2.7 GM/DL
GLUCOSE SERPL-MCNC: 83 MG/DL (ref 65–99)
HCT VFR BLD AUTO: 42.8 % (ref 37.5–51)
HDLC SERPL-MCNC: 51 MG/DL (ref 40–60)
HGB BLD-MCNC: 15.1 G/DL (ref 13–17.7)
IMM GRANULOCYTES # BLD AUTO: 0.01 10*3/MM3 (ref 0–0.05)
IMM GRANULOCYTES NFR BLD AUTO: 0.1 % (ref 0–0.5)
LDLC SERPL CALC-MCNC: 40 MG/DL (ref 0–100)
LDLC/HDLC SERPL: 0.76 {RATIO}
LYMPHOCYTES # BLD AUTO: 1.92 10*3/MM3 (ref 0.7–3.1)
LYMPHOCYTES NFR BLD AUTO: 28.4 % (ref 19.6–45.3)
MCH RBC QN AUTO: 32.6 PG (ref 26.6–33)
MCHC RBC AUTO-ENTMCNC: 35.3 G/DL (ref 31.5–35.7)
MCV RBC AUTO: 92.4 FL (ref 79–97)
MONOCYTES # BLD AUTO: 0.42 10*3/MM3 (ref 0.1–0.9)
MONOCYTES NFR BLD AUTO: 6.2 % (ref 5–12)
NEUTROPHILS NFR BLD AUTO: 4.1 10*3/MM3 (ref 1.7–7)
NEUTROPHILS NFR BLD AUTO: 60.6 % (ref 42.7–76)
NRBC BLD AUTO-RTO: 0 /100 WBC (ref 0–0.2)
PLATELET # BLD AUTO: 226 10*3/MM3 (ref 140–450)
PMV BLD AUTO: 11.2 FL (ref 6–12)
POTASSIUM SERPL-SCNC: 3.5 MMOL/L (ref 3.5–5.2)
PROT SERPL-MCNC: 7 G/DL (ref 6–8.5)
RBC # BLD AUTO: 4.63 10*6/MM3 (ref 4.14–5.8)
SODIUM SERPL-SCNC: 139 MMOL/L (ref 136–145)
TESTOST FREE SERPL-MCNC: 30.6 PG/ML (ref 8.7–25.1)
TESTOST SERPL-MCNC: 484 NG/DL (ref 264–916)
TRIGL SERPL-MCNC: 106 MG/DL (ref 0–150)
TSH SERPL DL<=0.05 MIU/L-ACNC: 1.39 UIU/ML (ref 0.27–4.2)
VLDLC SERPL-MCNC: 20 MG/DL (ref 5–40)
WBC # BLD AUTO: 6.77 10*3/MM3 (ref 3.4–10.8)

## 2020-11-04 ENCOUNTER — TELEPHONE (OUTPATIENT)
Dept: FAMILY MEDICINE CLINIC | Facility: CLINIC | Age: 35
End: 2020-11-04

## 2020-11-04 NOTE — TELEPHONE ENCOUNTER
----- Message from Rayray Telles DO sent at 11/4/2020  8:29 AM EST -----  I reviewed your labs.  Though your testosterone was in the low end of the normal range, it is still normal.  Recommend improving diet, and exercising regularly to help with symptoms of fatigue.  The remainder of your labs returned normal.

## 2021-04-13 DIAGNOSIS — K21.9 GASTROESOPHAGEAL REFLUX DISEASE: ICD-10-CM

## 2021-04-13 RX ORDER — OMEPRAZOLE 20 MG/1
CAPSULE, DELAYED RELEASE ORAL
Qty: 90 CAPSULE | Refills: 1 | Status: SHIPPED | OUTPATIENT
Start: 2021-04-13 | End: 2021-10-15

## 2021-10-15 DIAGNOSIS — K21.9 GASTROESOPHAGEAL REFLUX DISEASE: ICD-10-CM

## 2021-10-15 RX ORDER — OMEPRAZOLE 20 MG/1
CAPSULE, DELAYED RELEASE ORAL
Qty: 30 CAPSULE | Refills: 0 | Status: SHIPPED | OUTPATIENT
Start: 2021-10-15 | End: 2022-12-21 | Stop reason: SDUPTHER

## 2021-11-18 DIAGNOSIS — K21.9 GASTROESOPHAGEAL REFLUX DISEASE: ICD-10-CM

## 2021-11-19 RX ORDER — OMEPRAZOLE 20 MG/1
CAPSULE, DELAYED RELEASE ORAL
Qty: 30 CAPSULE | Refills: 0 | OUTPATIENT
Start: 2021-11-19

## 2022-12-16 DIAGNOSIS — K21.9 GASTROESOPHAGEAL REFLUX DISEASE: ICD-10-CM

## 2022-12-20 RX ORDER — OMEPRAZOLE 20 MG/1
CAPSULE, DELAYED RELEASE ORAL
Qty: 30 CAPSULE | Refills: 0 | OUTPATIENT
Start: 2022-12-20

## 2022-12-21 ENCOUNTER — HOSPITAL ENCOUNTER (EMERGENCY)
Facility: HOSPITAL | Age: 37
Discharge: HOME | End: 2022-12-21
Attending: EMERGENCY MEDICINE | Admitting: EMERGENCY MEDICINE

## 2022-12-21 ENCOUNTER — APPOINTMENT (OUTPATIENT)
Dept: CT IMAGING | Facility: HOSPITAL | Age: 37
End: 2022-12-21

## 2022-12-21 ENCOUNTER — APPOINTMENT (OUTPATIENT)
Dept: GENERAL RADIOLOGY | Facility: HOSPITAL | Age: 37
End: 2022-12-21

## 2022-12-21 ENCOUNTER — APPOINTMENT (OUTPATIENT)
Dept: ULTRASOUND IMAGING | Facility: HOSPITAL | Age: 37
End: 2022-12-21

## 2022-12-21 VITALS
WEIGHT: 165 LBS | RESPIRATION RATE: 18 BRPM | TEMPERATURE: 98.7 F | BODY MASS INDEX: 23.62 KG/M2 | SYSTOLIC BLOOD PRESSURE: 138 MMHG | OXYGEN SATURATION: 100 % | HEART RATE: 67 BPM | HEIGHT: 70 IN | DIASTOLIC BLOOD PRESSURE: 105 MMHG

## 2022-12-21 DIAGNOSIS — R10.13 EPIGASTRIC PAIN: Primary | ICD-10-CM

## 2022-12-21 DIAGNOSIS — K21.9 GASTROESOPHAGEAL REFLUX DISEASE: ICD-10-CM

## 2022-12-21 LAB
ALBUMIN SERPL-MCNC: 4.48 G/DL (ref 3.5–5.2)
ALBUMIN/GLOB SERPL: 1.3 G/DL
ALP SERPL-CCNC: 85 U/L (ref 39–117)
ALT SERPL W P-5'-P-CCNC: 49 U/L (ref 1–41)
AMPHET+METHAMPHET UR QL: NEGATIVE
AMPHETAMINES UR QL: NEGATIVE
AMYLASE SERPL-CCNC: 64 U/L (ref 28–100)
ANION GAP SERPL CALCULATED.3IONS-SCNC: 8.7 MMOL/L (ref 5–15)
AST SERPL-CCNC: 29 U/L (ref 1–40)
BARBITURATES UR QL SCN: NEGATIVE
BASOPHILS # BLD AUTO: 0.1 10*3/MM3 (ref 0–0.2)
BASOPHILS NFR BLD AUTO: 0.9 % (ref 0–1.5)
BENZODIAZ UR QL SCN: NEGATIVE
BILIRUB SERPL-MCNC: 0.6 MG/DL (ref 0–1.2)
BILIRUB UR QL STRIP: NEGATIVE
BUN SERPL-MCNC: 8 MG/DL (ref 6–20)
BUN/CREAT SERPL: 8.1 (ref 7–25)
BUPRENORPHINE SERPL-MCNC: NEGATIVE NG/ML
CALCIUM SPEC-SCNC: 9.9 MG/DL (ref 8.6–10.5)
CANNABINOIDS SERPL QL: NEGATIVE
CHLORIDE SERPL-SCNC: 103 MMOL/L (ref 98–107)
CLARITY UR: CLEAR
CO2 SERPL-SCNC: 29.3 MMOL/L (ref 22–29)
COCAINE UR QL: NEGATIVE
COLOR UR: YELLOW
CREAT SERPL-MCNC: 0.99 MG/DL (ref 0.76–1.27)
CRP SERPL-MCNC: 1.21 MG/DL (ref 0–0.5)
D-LACTATE SERPL-SCNC: 2 MMOL/L (ref 0.5–2)
DEPRECATED RDW RBC AUTO: 41.1 FL (ref 37–54)
EGFRCR SERPLBLD CKD-EPI 2021: 100 ML/MIN/1.73
EOSINOPHIL # BLD AUTO: 0.18 10*3/MM3 (ref 0–0.4)
EOSINOPHIL NFR BLD AUTO: 1.6 % (ref 0.3–6.2)
ERYTHROCYTE [DISTWIDTH] IN BLOOD BY AUTOMATED COUNT: 11.9 % (ref 12.3–15.4)
ETHANOL BLD-MCNC: 10 MG/DL (ref 0–10)
ETHANOL UR QL: 0.01 %
FLUAV RNA RESP QL NAA+PROBE: NOT DETECTED
FLUBV RNA RESP QL NAA+PROBE: NOT DETECTED
GLOBULIN UR ELPH-MCNC: 3.3 GM/DL
GLUCOSE SERPL-MCNC: 105 MG/DL (ref 65–99)
GLUCOSE UR STRIP-MCNC: NEGATIVE MG/DL
HCT VFR BLD AUTO: 47.8 % (ref 37.5–51)
HGB BLD-MCNC: 16.9 G/DL (ref 13–17.7)
HGB UR QL STRIP.AUTO: NEGATIVE
HOLD SPECIMEN: NORMAL
HOLD SPECIMEN: NORMAL
IMM GRANULOCYTES # BLD AUTO: 0.04 10*3/MM3 (ref 0–0.05)
IMM GRANULOCYTES NFR BLD AUTO: 0.4 % (ref 0–0.5)
KETONES UR QL STRIP: NEGATIVE
LEUKOCYTE ESTERASE UR QL STRIP.AUTO: NEGATIVE
LIPASE SERPL-CCNC: 32 U/L (ref 13–60)
LYMPHOCYTES # BLD AUTO: 2.39 10*3/MM3 (ref 0.7–3.1)
LYMPHOCYTES NFR BLD AUTO: 20.9 % (ref 19.6–45.3)
MAGNESIUM SERPL-MCNC: 2.2 MG/DL (ref 1.6–2.6)
MCH RBC QN AUTO: 33.1 PG (ref 26.6–33)
MCHC RBC AUTO-ENTMCNC: 35.4 G/DL (ref 31.5–35.7)
MCV RBC AUTO: 93.5 FL (ref 79–97)
METHADONE UR QL SCN: NEGATIVE
MONOCYTES # BLD AUTO: 0.69 10*3/MM3 (ref 0.1–0.9)
MONOCYTES NFR BLD AUTO: 6 % (ref 5–12)
NEUTROPHILS NFR BLD AUTO: 70.2 % (ref 42.7–76)
NEUTROPHILS NFR BLD AUTO: 8.01 10*3/MM3 (ref 1.7–7)
NITRITE UR QL STRIP: NEGATIVE
NRBC BLD AUTO-RTO: 0 /100 WBC (ref 0–0.2)
OPIATES UR QL: NEGATIVE
OXYCODONE UR QL SCN: NEGATIVE
PCP UR QL SCN: NEGATIVE
PH UR STRIP.AUTO: 7.5 [PH] (ref 5–8)
PLATELET # BLD AUTO: 288 10*3/MM3 (ref 140–450)
PMV BLD AUTO: 10.2 FL (ref 6–12)
POTASSIUM SERPL-SCNC: 4.1 MMOL/L (ref 3.5–5.2)
PROPOXYPH UR QL: NEGATIVE
PROT SERPL-MCNC: 7.8 G/DL (ref 6–8.5)
PROT UR QL STRIP: NEGATIVE
RBC # BLD AUTO: 5.11 10*6/MM3 (ref 4.14–5.8)
SARS-COV-2 RNA RESP QL NAA+PROBE: NOT DETECTED
SODIUM SERPL-SCNC: 141 MMOL/L (ref 136–145)
SP GR UR STRIP: 1.01 (ref 1–1.03)
TRICYCLICS UR QL SCN: NEGATIVE
TROPONIN T SERPL-MCNC: <0.01 NG/ML (ref 0–0.03)
TROPONIN T SERPL-MCNC: <0.01 NG/ML (ref 0–0.03)
UROBILINOGEN UR QL STRIP: NORMAL
WBC NRBC COR # BLD: 11.41 10*3/MM3 (ref 3.4–10.8)
WHOLE BLOOD HOLD COAG: NORMAL
WHOLE BLOOD HOLD SPECIMEN: NORMAL

## 2022-12-21 PROCEDURE — 96374 THER/PROPH/DIAG INJ IV PUSH: CPT

## 2022-12-21 PROCEDURE — 84484 ASSAY OF TROPONIN QUANT: CPT | Performed by: EMERGENCY MEDICINE

## 2022-12-21 PROCEDURE — 85025 COMPLETE CBC W/AUTO DIFF WBC: CPT | Performed by: EMERGENCY MEDICINE

## 2022-12-21 PROCEDURE — 25010000002 IOPAMIDOL 61 % SOLUTION: Performed by: EMERGENCY MEDICINE

## 2022-12-21 PROCEDURE — 82077 ASSAY SPEC XCP UR&BREATH IA: CPT | Performed by: EMERGENCY MEDICINE

## 2022-12-21 PROCEDURE — 87636 SARSCOV2 & INF A&B AMP PRB: CPT | Performed by: PHYSICIAN ASSISTANT

## 2022-12-21 PROCEDURE — 25010000002 ONDANSETRON PER 1 MG: Performed by: PHYSICIAN ASSISTANT

## 2022-12-21 PROCEDURE — 99284 EMERGENCY DEPT VISIT MOD MDM: CPT

## 2022-12-21 PROCEDURE — 83605 ASSAY OF LACTIC ACID: CPT | Performed by: PHYSICIAN ASSISTANT

## 2022-12-21 PROCEDURE — 36415 COLL VENOUS BLD VENIPUNCTURE: CPT

## 2022-12-21 PROCEDURE — 76705 ECHO EXAM OF ABDOMEN: CPT

## 2022-12-21 PROCEDURE — 80306 DRUG TEST PRSMV INSTRMNT: CPT | Performed by: PHYSICIAN ASSISTANT

## 2022-12-21 PROCEDURE — 86140 C-REACTIVE PROTEIN: CPT | Performed by: EMERGENCY MEDICINE

## 2022-12-21 PROCEDURE — 80053 COMPREHEN METABOLIC PANEL: CPT | Performed by: EMERGENCY MEDICINE

## 2022-12-21 PROCEDURE — 81003 URINALYSIS AUTO W/O SCOPE: CPT | Performed by: PHYSICIAN ASSISTANT

## 2022-12-21 PROCEDURE — 76705 ECHO EXAM OF ABDOMEN: CPT | Performed by: RADIOLOGY

## 2022-12-21 PROCEDURE — 93010 ELECTROCARDIOGRAM REPORT: CPT | Performed by: INTERNAL MEDICINE

## 2022-12-21 PROCEDURE — 74177 CT ABD & PELVIS W/CONTRAST: CPT

## 2022-12-21 PROCEDURE — 84484 ASSAY OF TROPONIN QUANT: CPT | Performed by: PHYSICIAN ASSISTANT

## 2022-12-21 PROCEDURE — 93005 ELECTROCARDIOGRAM TRACING: CPT | Performed by: PHYSICIAN ASSISTANT

## 2022-12-21 PROCEDURE — 83690 ASSAY OF LIPASE: CPT | Performed by: EMERGENCY MEDICINE

## 2022-12-21 PROCEDURE — 83735 ASSAY OF MAGNESIUM: CPT | Performed by: EMERGENCY MEDICINE

## 2022-12-21 PROCEDURE — 96375 TX/PRO/DX INJ NEW DRUG ADDON: CPT

## 2022-12-21 PROCEDURE — 82150 ASSAY OF AMYLASE: CPT | Performed by: EMERGENCY MEDICINE

## 2022-12-21 PROCEDURE — 71045 X-RAY EXAM CHEST 1 VIEW: CPT | Performed by: RADIOLOGY

## 2022-12-21 PROCEDURE — 71045 X-RAY EXAM CHEST 1 VIEW: CPT

## 2022-12-21 PROCEDURE — 74177 CT ABD & PELVIS W/CONTRAST: CPT | Performed by: RADIOLOGY

## 2022-12-21 RX ORDER — SODIUM CHLORIDE 0.9 % (FLUSH) 0.9 %
10 SYRINGE (ML) INJECTION AS NEEDED
Status: DISCONTINUED | OUTPATIENT
Start: 2022-12-21 | End: 2022-12-21 | Stop reason: HOSPADM

## 2022-12-21 RX ORDER — ALUMINA, MAGNESIA, AND SIMETHICONE 2400; 2400; 240 MG/30ML; MG/30ML; MG/30ML
15 SUSPENSION ORAL ONCE
Status: COMPLETED | OUTPATIENT
Start: 2022-12-21 | End: 2022-12-21

## 2022-12-21 RX ORDER — FAMOTIDINE 10 MG/ML
20 INJECTION, SOLUTION INTRAVENOUS ONCE
Status: COMPLETED | OUTPATIENT
Start: 2022-12-21 | End: 2022-12-21

## 2022-12-21 RX ORDER — ONDANSETRON 2 MG/ML
4 INJECTION INTRAMUSCULAR; INTRAVENOUS ONCE
Status: COMPLETED | OUTPATIENT
Start: 2022-12-21 | End: 2022-12-21

## 2022-12-21 RX ORDER — OMEPRAZOLE 20 MG/1
20 CAPSULE, DELAYED RELEASE ORAL DAILY
Qty: 30 CAPSULE | Refills: 0 | Status: SHIPPED | OUTPATIENT
Start: 2022-12-21 | End: 2023-01-04 | Stop reason: HOSPADM

## 2022-12-21 RX ORDER — LIDOCAINE HYDROCHLORIDE 20 MG/ML
15 SOLUTION OROPHARYNGEAL ONCE
Status: COMPLETED | OUTPATIENT
Start: 2022-12-21 | End: 2022-12-21

## 2022-12-21 RX ADMIN — ALUMINUM HYDROXIDE, MAGNESIUM HYDROXIDE, AND DIMETHICONE 15 ML: 400; 400; 40 SUSPENSION ORAL at 11:25

## 2022-12-21 RX ADMIN — ONDANSETRON 4 MG: 2 INJECTION INTRAMUSCULAR; INTRAVENOUS at 10:56

## 2022-12-21 RX ADMIN — LIDOCAINE HYDROCHLORIDE 15 ML: 20 SOLUTION ORAL; TOPICAL at 11:25

## 2022-12-21 RX ADMIN — FAMOTIDINE 20 MG: 10 INJECTION INTRAVENOUS at 11:25

## 2022-12-21 RX ADMIN — IOPAMIDOL 85 ML: 612 INJECTION, SOLUTION INTRAVENOUS at 12:43

## 2022-12-21 RX ADMIN — SODIUM CHLORIDE 1000 ML: 9 INJECTION, SOLUTION INTRAVENOUS at 10:57

## 2022-12-21 NOTE — ED PROVIDER NOTES
Subjective   History of Present Illness  37-year-old male presents to the ED today for epigastric pain.  He states this started yesterday around 11 AM.  He states initially it was constant yesterday but today it is more intermittent.  He states currently he is not having any pain but when the pain hits it is quite severe.  He states the pain radiates into his back and up into his chest.  He states he had a lot of dry heaves yesterday.  He denies any diarrhea.  He denies any urinary symptoms.  He denies any fever.  He states it feels like everything he swallows gets stuck in his lower esophagus.  He does have a history of GERD.  He takes Prilosec as needed.  He states he did take Prilosec yesterday with no relief of his symptoms.  He states he also tried Tums, drinking buttermilk and apple cider vinegar.  He went to urgent care today and was sent here for further evaluation.  He states he is not had any surgeries and denies any other medical problems.  He states he does smoke a pack of cigarettes a day.  He states he drinks 4 or 5 beers daily.  His last drink was about 2 days ago.  He denies any past history of pancreatitis or gallbladder issues.    History provided by:  Patient  Abdominal Pain  Pain location:  Epigastric  Pain quality: sharp    Pain radiates to:  Back  Pain severity:  Severe  Onset quality:  Gradual  Duration:  1 day  Timing:  Intermittent  Progression:  Waxing and waning  Chronicity:  New  Context: alcohol use    Context: not trauma    Relieved by:  Nothing  Worsened by:  Nothing  Ineffective treatments:  OTC medications and antacids  Associated symptoms: chest pain and nausea    Associated symptoms: no anorexia, no chills, no constipation, no cough, no diarrhea, no dysuria, no fatigue, no fever, no hematemesis, no hematochezia, no hematuria, no melena, no shortness of breath and no vomiting        Review of Systems   Constitutional: Positive for appetite change. Negative for chills, fatigue and  fever.   HENT: Negative.    Eyes: Negative.    Respiratory: Negative for cough and shortness of breath.    Cardiovascular: Positive for chest pain.   Gastrointestinal: Positive for abdominal pain and nausea. Negative for anorexia, constipation, diarrhea, hematemesis, hematochezia, melena and vomiting.   Genitourinary: Negative for dysuria and hematuria.   Musculoskeletal: Positive for back pain.   Skin: Negative.    Neurological: Negative.    Psychiatric/Behavioral: Negative.    All other systems reviewed and are negative.      No past medical history on file.    No Known Allergies    No past surgical history on file.    Family History   Problem Relation Age of Onset   • Bipolar disorder Mother    • Schizophrenia Mother    • Depression Mother    • Kidney disease Father    • Stroke Paternal Aunt    • Schizophrenia Maternal Grandfather    • Bipolar disorder Maternal Grandfather    • Dementia Paternal Grandmother        Social History     Socioeconomic History   • Marital status: Single   Tobacco Use   • Smoking status: Every Day     Packs/day: 1.00     Years: 20.00     Pack years: 20.00     Types: Cigarettes   • Smokeless tobacco: Current     Types: Snuff   Substance and Sexual Activity   • Alcohol use: Yes     Alcohol/week: 18.0 standard drinks     Types: 18 Cans of beer per week   • Drug use: No   • Sexual activity: Defer           Objective   Physical Exam  Vitals and nursing note reviewed.   Constitutional:       General: He is not in acute distress.     Appearance: He is well-developed. He is not diaphoretic.   HENT:      Head: Normocephalic and atraumatic.   Eyes:      Extraocular Movements: Extraocular movements intact.      Pupils: Pupils are equal, round, and reactive to light.   Cardiovascular:      Rate and Rhythm: Normal rate and regular rhythm.      Heart sounds: Normal heart sounds.   Pulmonary:      Effort: Pulmonary effort is normal.      Breath sounds: Normal breath sounds. No wheezing or rhonchi.    Chest:      Chest wall: No tenderness.   Abdominal:      General: Bowel sounds are normal.      Palpations: Abdomen is soft.      Tenderness: There is abdominal tenderness (no evidence of an acute surgical abdomen) in the epigastric area. There is no right CVA tenderness, left CVA tenderness, guarding or rebound.   Skin:     General: Skin is warm and dry.      Capillary Refill: Capillary refill takes less than 2 seconds.   Neurological:      General: No focal deficit present.      Mental Status: He is alert and oriented to person, place, and time.   Psychiatric:         Mood and Affect: Mood normal.         Procedures           ED Course  ED Course as of 12/21/22 1430   Wed Dec 21, 2022   1115 US Abdomen Limited  FINDINGS:    Liver:  Unremarkable.  No mass.  No intrahepatic bile duct dilation.    Gallbladder:  Gallbladder wall is within normal limits.  No shadowing  gallstones identified.    Common bile duct:  Common bile duct is 3 mm diameter.  No stones.  No  dilation.    Pancreas:  Unremarkable as visualized.     IMPRESSION:    Unremarkable exam. No acute findings sonographically evident. [AH]   1140 XR Chest 1 View  FINDINGS:    Lungs:  Unremarkable.  No consolidation.    Pleural space:  Unremarkable.  No pneumothorax.    Heart:  Unremarkable.  No cardiomegaly.    Mediastinum:  Unremarkable.    Bones/joints:  Unremarkable.     IMPRESSION:    Unremarkable exam. No acute cardiopulmonary findings identified. [AH]   1150 ECG 12 Lead Other; epigastric pain  Vent. Rate :  83 BPM     Atrial Rate :  83 BPM     P-R Int : 144 ms          QRS Dur :  76 ms      QT Int : 366 ms       P-R-T Axes :  53  22  51 degrees     QTc Int : 430 ms     Normal sinus rhythm with sinus arrhythmia  Possible Left atrial enlargement  Septal infarct , age undetermined  Abnormal ECG  No previous ECGs available [ES]   1346 CT Abdomen Pelvis With Contrast  IMPRESSION:  1.  Dilated fluid-filled esophagus to the level of GE junction  suspicious  for achalasia. Upper endoscopy may be indicated to further  evaluate.  2.  Mild diffuse fatty infiltration of liver.  3. Other nonacute findings as above. [AH]   5010 I discussed the CT findings with Dr. Lyons.  She advised the patient could follow-up outpatient with the GI clinic and be scheduled for an EGD.  She states the patient may require dilatation of the esophagus.  I have discussed all this with the patient and he is agreeable with the plan.  He states he is currently out of his Prilosec so a new prescription has been provided to him.  He was advised to take this daily.  He is currently tolerating p.o. fluids in the ED.  He was advised to return to the ED anytime for symptoms change or worsen.  He was also advised to follow-up with his PCP in the office as his blood pressure was elevated in the ED today and I recommended that he follow-up to see if he needed to be started on medication for this. [AH]      ED Course User Index  [AH] Elli Osborne PA  [ES] Arsh Jeffery MD                                           MDM  Number of Diagnoses or Management Options     Amount and/or Complexity of Data Reviewed  Clinical lab tests: reviewed  Tests in the radiology section of CPT®: reviewed  Tests in the medicine section of CPT®: reviewed  Discuss the patient with other providers: yes    Patient Progress  Patient progress: improved      Final diagnoses:   Epigastric pain       ED Disposition  ED Disposition     ED Disposition   Discharge    Condition   Stable    Comment   --             Fern Auguste MD  [address] [facility]  [address]  [place] [address]  [phone]    Schedule an appointment as soon as possible for a visit in 2 days      Rayray Telles, DO  [address] [place] [address]  [phone]    Schedule an appointment as soon as possible for a visit in 2 days           Where to Get Your Medications      You can get these medications from any pharmacy    Bring a paper prescription for  each of these medications  · omeprazole 20 MG capsule        Medication List      No changes were made to your prescriptions during this visit.          Elli Osborne PA  12/21/22 1807

## 2022-12-21 NOTE — DISCHARGE INSTRUCTIONS
Call the GI clinic to schedule the next available appointment.  You will most likely need an upper GI scope to further evaluate your symptoms.  Take your Prilosec every day.  Make sure you are drinking plenty of fluids.  Please also follow-up with your family doctor in the office at the next available appointment as your blood pressure was high in the ER today and they need to follow this to see if you need to be started on medication.  Return to the ED at any time if your symptoms change or worsen.

## 2022-12-22 ENCOUNTER — OFFICE VISIT (OUTPATIENT)
Dept: GASTROENTEROLOGY | Facility: CLINIC | Age: 37
End: 2022-12-22

## 2022-12-22 VITALS — HEIGHT: 70 IN | BODY MASS INDEX: 23.94 KG/M2 | WEIGHT: 167.2 LBS

## 2022-12-22 DIAGNOSIS — R13.19 ESOPHAGEAL DYSPHAGIA: Primary | ICD-10-CM

## 2022-12-22 PROCEDURE — 99204 OFFICE O/P NEW MOD 45 MIN: CPT | Performed by: PHYSICIAN ASSISTANT

## 2022-12-22 NOTE — H&P (VIEW-ONLY)
Chief Complaint   Patient presents with   • Difficulty Swallowing       Alan Contreras is a 37 y.o. male who presents to the office today for evaluation of Difficulty Swallowing  .    HPI     The patient is a new patient presenting for difficulty swallowing. He was seen in the emergency department yesterday, 12/21/2022, in which he had a CT done that described a dilated fluid filled esophagus to the level of the GE junction suspicious for achalasia.    Difficulty swallowing  The patient reports that he is better today than he has been. He mentions  that since Tuesday morning, 12/20/2022, everything he swallows goes \" right here \" as he points to the cervical esophagus region and it hurts so bad. The patient reports that he is not able to get liquids down easily. He mentions that he can swallow water, but it does not matter what he eats. The patient reports that he had his wife make him eat Tuesday night, 12/20/2022, because he was hungry. He mentions that he can get it down, but he can feel it go down but hang and it just burns. The patient reports that if it is cold, he can just feel it just stop. He mentions that today it does not feel as bad as it has been the last 2 days. The patient reports that he has not been having more heartburn than normal. He mentions that he has had GERD for quite a long time.   He mentions that since Tuesday morning, things have just gotten stuck. The patient reports that his wife bagged him to go to the emergency room because she thought it was his gallbladder. He mentions that he was in excruciating pain. . The patient reports that he woke up Wednesday morning and went on to work. He mentions that he thought it would be better by the morning, but it was still hurting, but not too bad. The patient reports that he went to the urgent care yesterday morning, 12/21/2022, and they checked him and they thought it was his pancreas. He mentions that they sent him to the emergency room. The  patient reports that they done all the CT scans and all this stuff. He mentions that they said that his gallbladder and pancreas looked good, but he had a big fluid buildup where he was told that was hurting that. The patient reports that they thought that his esophagus had gotten small to let anything pass on through.  He mentions that he was supposed to have the EGD with dilation done 4 or 5 years ago. He mentions that he learned what he could eat when he could not.  He mentions that on Tuesday, 12/21/2022, he literally thought he was going to die. The patient reports that it hurts so bad all the way around and in his back. He mentions that it feels like someone just stabbed him sometimes.    Review of Systems   Constitutional: Negative for activity change, appetite change, fatigue and fever.   HENT: Positive for trouble swallowing. Negative for mouth sores, sore throat and voice change.    Eyes: Negative.  Negative for visual disturbance.   Respiratory: Negative.  Negative for cough and shortness of breath.    Cardiovascular: Negative.  Negative for chest pain.   Gastrointestinal: Positive for abdominal pain, constipation and nausea. Negative for abdominal distention, anal bleeding, blood in stool, diarrhea and vomiting.   Endocrine: Negative.  Negative for cold intolerance and heat intolerance.   Genitourinary: Negative.  Negative for flank pain and hematuria.   Musculoskeletal: Negative.  Negative for arthralgias and back pain.   Skin: Negative.  Negative for color change.   Allergic/Immunologic: Negative.  Negative for food allergies.   Neurological: Negative.  Negative for syncope and weakness.   Hematological: Negative.    Psychiatric/Behavioral: Negative.  Negative for confusion. The patient is not hyperactive.        ACTIVE PROBLEMS:   Specialty Problems        Gastroenterology Problems    Gastroesophageal reflux disease           PAST MEDICAL HISTORY:  History reviewed. No pertinent past medical  history.    SURGICAL HISTORY:  History reviewed. No pertinent surgical history.    FAMILY HISTORY:  Family History   Problem Relation Age of Onset   • Bipolar disorder Mother    • Schizophrenia Mother    • Depression Mother    • Kidney disease Father    • Stroke Paternal Aunt    • Schizophrenia Maternal Grandfather    • Bipolar disorder Maternal Grandfather    • Dementia Paternal Grandmother        SOCIAL HISTORY:  Social History     Tobacco Use   • Smoking status: Every Day     Packs/day: 1.00     Years: 20.00     Pack years: 20.00     Types: Cigarettes   • Smokeless tobacco: Current     Types: Snuff   Substance Use Topics   • Alcohol use: Yes     Alcohol/week: 18.0 standard drinks     Types: 18 Cans of beer per week       CURRENT MEDICATION:    Current Outpatient Medications:   •  omeprazole (priLOSEC) 20 MG capsule, Take 1 capsule by mouth Daily., Disp: 30 capsule, Rfl: 0    ALLERGIES:  Patient has no known allergies.    VISIT VITALS:  Ht 177.8 cm (70\")   Wt 75.8 kg (167 lb 3.2 oz)   BMI 23.99 kg/m²   Physical Exam  Constitutional:       General: He is not in acute distress.     Appearance: Normal appearance. He is well-developed.   HENT:      Head: Normocephalic and atraumatic.   Eyes:      Pupils: Pupils are equal, round, and reactive to light.   Cardiovascular:      Rate and Rhythm: Normal rate and regular rhythm.      Heart sounds: Normal heart sounds.   Pulmonary:      Effort: Pulmonary effort is normal. No respiratory distress.      Breath sounds: Normal breath sounds. No wheezing, rhonchi or rales.   Abdominal:      General: Abdomen is flat. Bowel sounds are normal. There is no distension.      Palpations: Abdomen is soft. There is no mass.      Tenderness: There is no abdominal tenderness. There is no guarding or rebound.      Hernia: No hernia is present.   Musculoskeletal:         General: No swelling. Normal range of motion.      Cervical back: Normal range of motion and neck supple.      Right lower  leg: No edema.      Left lower leg: No edema.   Skin:     General: Skin is warm and dry.   Neurological:      Mental Status: He is alert and oriented to person, place, and time.   Psychiatric:         Attention and Perception: Attention normal.         Mood and Affect: Mood normal.         Speech: Speech normal.         Behavior: Behavior normal. Behavior is cooperative.         Thought Content: Thought content normal.         Assessment       Diagnosis Plan   1. Esophageal dysphagia  FL Esophagram Complete    Case Request    Follow Anesthesia Guidelines / Protocol    Obtain Informed Consent    Case Request          Return for after procedure follow-up.         1. Dysphagia  - Will order an esophagram.   - Will order an EGD in the first week of 01/2023.  - Advised patient to eat soft foods and avoid any meats or breads.  - Advised patient to go to the emergency room if he has any problems.        This document has been electronically signed by Jose Armando Layton PA-C  December 29, 2022 14:33 EST    Part of this note may be an electronic transcription/translation of spoken language to printed text using the Dragon Dictation System.          Transcribed from ambient dictation for Jose Armando Layton PA-C by Guerda Apple.  12/22/22   12:36 EST    Patient or patient representative verbalized consent to the visit recording.  I have personally performed the services described in this document as transcribed by the above individual, and it is both accurate and complete.

## 2022-12-22 NOTE — PROGRESS NOTES
"Chief Complaint   Patient presents with   • Difficulty Swallowing       Alan Contreras is a 37 y.o. male who presents to the office today for evaluation of Difficulty Swallowing  .    HPI     The patient is a new patient presenting for difficulty swallowing. He was seen in the emergency department yesterday, 12/21/2022, in which he had a CT done that described a dilated fluid filled esophagus to the level of the GE junction suspicious for achalasia.    Difficulty swallowing  The patient reports that he is better today than he has been. He mentions  that since Tuesday morning, 12/20/2022, everything he swallows goes \" right here \" as he points to the cervical esophagus region and it hurts so bad. The patient reports that he is not able to get liquids down easily. He mentions that he can swallow water, but it does not matter what he eats. The patient reports that he had his wife make him eat Tuesday night, 12/20/2022, because he was hungry. He mentions that he can get it down, but he can feel it go down but hang and it just burns. The patient reports that if it is cold, he can just feel it just stop. He mentions that today it does not feel as bad as it has been the last 2 days. The patient reports that he has not been having more heartburn than normal. He mentions that he has had GERD for quite a long time.   He mentions that since Tuesday morning, things have just gotten stuck. The patient reports that his wife bagged him to go to the emergency room because she thought it was his gallbladder. He mentions that he was in excruciating pain. . The patient reports that he woke up Wednesday morning and went on to work. He mentions that he thought it would be better by the morning, but it was still hurting, but not too bad. The patient reports that he went to the urgent care yesterday morning, 12/21/2022, and they checked him and they thought it was his pancreas. He mentions that they sent him to the emergency room. The " patient reports that they done all the CT scans and all this stuff. He mentions that they said that his gallbladder and pancreas looked good, but he had a big fluid buildup where he was told that was hurting that. The patient reports that they thought that his esophagus had gotten small to let anything pass on through.  He mentions that he was supposed to have the EGD with dilation done 4 or 5 years ago. He mentions that he learned what he could eat when he could not.  He mentions that on Tuesday, 12/21/2022, he literally thought he was going to die. The patient reports that it hurts so bad all the way around and in his back. He mentions that it feels like someone just stabbed him sometimes.    Review of Systems   Constitutional: Negative for activity change, appetite change, fatigue and fever.   HENT: Positive for trouble swallowing. Negative for mouth sores, sore throat and voice change.    Eyes: Negative.  Negative for visual disturbance.   Respiratory: Negative.  Negative for cough and shortness of breath.    Cardiovascular: Negative.  Negative for chest pain.   Gastrointestinal: Positive for abdominal pain, constipation and nausea. Negative for abdominal distention, anal bleeding, blood in stool, diarrhea and vomiting.   Endocrine: Negative.  Negative for cold intolerance and heat intolerance.   Genitourinary: Negative.  Negative for flank pain and hematuria.   Musculoskeletal: Negative.  Negative for arthralgias and back pain.   Skin: Negative.  Negative for color change.   Allergic/Immunologic: Negative.  Negative for food allergies.   Neurological: Negative.  Negative for syncope and weakness.   Hematological: Negative.    Psychiatric/Behavioral: Negative.  Negative for confusion. The patient is not hyperactive.        ACTIVE PROBLEMS:   Specialty Problems        Gastroenterology Problems    Gastroesophageal reflux disease           PAST MEDICAL HISTORY:  History reviewed. No pertinent past medical  "history.    SURGICAL HISTORY:  History reviewed. No pertinent surgical history.    FAMILY HISTORY:  Family History   Problem Relation Age of Onset   • Bipolar disorder Mother    • Schizophrenia Mother    • Depression Mother    • Kidney disease Father    • Stroke Paternal Aunt    • Schizophrenia Maternal Grandfather    • Bipolar disorder Maternal Grandfather    • Dementia Paternal Grandmother        SOCIAL HISTORY:  Social History     Tobacco Use   • Smoking status: Every Day     Packs/day: 1.00     Years: 20.00     Pack years: 20.00     Types: Cigarettes   • Smokeless tobacco: Current     Types: Snuff   Substance Use Topics   • Alcohol use: Yes     Alcohol/week: 18.0 standard drinks     Types: 18 Cans of beer per week       CURRENT MEDICATION:    Current Outpatient Medications:   •  omeprazole (priLOSEC) 20 MG capsule, Take 1 capsule by mouth Daily., Disp: 30 capsule, Rfl: 0    ALLERGIES:  Patient has no known allergies.    VISIT VITALS:  Ht 177.8 cm (70\")   Wt 75.8 kg (167 lb 3.2 oz)   BMI 23.99 kg/m²   Physical Exam  Constitutional:       General: He is not in acute distress.     Appearance: Normal appearance. He is well-developed.   HENT:      Head: Normocephalic and atraumatic.   Eyes:      Pupils: Pupils are equal, round, and reactive to light.   Cardiovascular:      Rate and Rhythm: Normal rate and regular rhythm.      Heart sounds: Normal heart sounds.   Pulmonary:      Effort: Pulmonary effort is normal. No respiratory distress.      Breath sounds: Normal breath sounds. No wheezing, rhonchi or rales.   Abdominal:      General: Abdomen is flat. Bowel sounds are normal. There is no distension.      Palpations: Abdomen is soft. There is no mass.      Tenderness: There is no abdominal tenderness. There is no guarding or rebound.      Hernia: No hernia is present.   Musculoskeletal:         General: No swelling. Normal range of motion.      Cervical back: Normal range of motion and neck supple.      Right lower " leg: No edema.      Left lower leg: No edema.   Skin:     General: Skin is warm and dry.   Neurological:      Mental Status: He is alert and oriented to person, place, and time.   Psychiatric:         Attention and Perception: Attention normal.         Mood and Affect: Mood normal.         Speech: Speech normal.         Behavior: Behavior normal. Behavior is cooperative.         Thought Content: Thought content normal.         Assessment       Diagnosis Plan   1. Esophageal dysphagia  FL Esophagram Complete    Case Request    Follow Anesthesia Guidelines / Protocol    Obtain Informed Consent    Case Request          Return for after procedure follow-up.         1. Dysphagia  - Will order an esophagram.   - Will order an EGD in the first week of 01/2023.  - Advised patient to eat soft foods and avoid any meats or breads.  - Advised patient to go to the emergency room if he has any problems.        This document has been electronically signed by Jose Armando Layton PA-C  December 29, 2022 14:33 EST    Part of this note may be an electronic transcription/translation of spoken language to printed text using the Dragon Dictation System.          Transcribed from ambient dictation for Jose Armando Layton PA-C by Guerda Apple.  12/22/22   12:36 EST    Patient or patient representative verbalized consent to the visit recording.  I have personally performed the services described in this document as transcribed by the above individual, and it is both accurate and complete.

## 2022-12-24 LAB
QT INTERVAL: 366 MS
QTC INTERVAL: 430 MS

## 2023-01-04 ENCOUNTER — ANESTHESIA EVENT (OUTPATIENT)
Dept: PERIOP | Facility: HOSPITAL | Age: 38
End: 2023-01-04
Payer: COMMERCIAL

## 2023-01-04 ENCOUNTER — ANESTHESIA (OUTPATIENT)
Dept: PERIOP | Facility: HOSPITAL | Age: 38
End: 2023-01-04
Payer: COMMERCIAL

## 2023-01-04 ENCOUNTER — TELEPHONE (OUTPATIENT)
Dept: GASTROENTEROLOGY | Facility: CLINIC | Age: 38
End: 2023-01-04
Payer: COMMERCIAL

## 2023-01-04 ENCOUNTER — HOSPITAL ENCOUNTER (OUTPATIENT)
Facility: HOSPITAL | Age: 38
Setting detail: HOSPITAL OUTPATIENT SURGERY
Discharge: HOME | End: 2023-01-04
Attending: INTERNAL MEDICINE | Admitting: INTERNAL MEDICINE
Payer: COMMERCIAL

## 2023-01-04 VITALS
BODY MASS INDEX: 22.9 KG/M2 | HEIGHT: 70 IN | OXYGEN SATURATION: 98 % | WEIGHT: 160 LBS | SYSTOLIC BLOOD PRESSURE: 130 MMHG | DIASTOLIC BLOOD PRESSURE: 97 MMHG | TEMPERATURE: 97 F | HEART RATE: 81 BPM | RESPIRATION RATE: 18 BRPM

## 2023-01-04 DIAGNOSIS — R13.19 ESOPHAGEAL DYSPHAGIA: ICD-10-CM

## 2023-01-04 PROCEDURE — 43235 EGD DIAGNOSTIC BRUSH WASH: CPT | Performed by: INTERNAL MEDICINE

## 2023-01-04 PROCEDURE — 25010000002 SUCCINYLCHOLINE PER 20 MG: Performed by: NURSE ANESTHETIST, CERTIFIED REGISTERED

## 2023-01-04 PROCEDURE — 25010000002 MIDAZOLAM PER 1MG: Performed by: NURSE ANESTHETIST, CERTIFIED REGISTERED

## 2023-01-04 PROCEDURE — 25010000002 PROPOFOL 200 MG/20ML EMULSION: Performed by: NURSE ANESTHETIST, CERTIFIED REGISTERED

## 2023-01-04 PROCEDURE — 25010000002 FENTANYL CITRATE (PF) 50 MCG/ML SOLUTION: Performed by: NURSE ANESTHETIST, CERTIFIED REGISTERED

## 2023-01-04 RX ORDER — KETOROLAC TROMETHAMINE 30 MG/ML
30 INJECTION, SOLUTION INTRAMUSCULAR; INTRAVENOUS EVERY 6 HOURS PRN
Status: DISCONTINUED | OUTPATIENT
Start: 2023-01-04 | End: 2023-01-04 | Stop reason: HOSPADM

## 2023-01-04 RX ORDER — MIDAZOLAM HYDROCHLORIDE 2 MG/2ML
INJECTION, SOLUTION INTRAMUSCULAR; INTRAVENOUS AS NEEDED
Status: DISCONTINUED | OUTPATIENT
Start: 2023-01-04 | End: 2023-01-04 | Stop reason: SURG

## 2023-01-04 RX ORDER — SODIUM CHLORIDE 9 MG/ML
40 INJECTION, SOLUTION INTRAVENOUS AS NEEDED
Status: DISCONTINUED | OUTPATIENT
Start: 2023-01-04 | End: 2023-01-04 | Stop reason: HOSPADM

## 2023-01-04 RX ORDER — SUCCINYLCHOLINE CHLORIDE 20 MG/ML
INJECTION INTRAMUSCULAR; INTRAVENOUS AS NEEDED
Status: DISCONTINUED | OUTPATIENT
Start: 2023-01-04 | End: 2023-01-04 | Stop reason: SURG

## 2023-01-04 RX ORDER — SODIUM CHLORIDE, SODIUM LACTATE, POTASSIUM CHLORIDE, CALCIUM CHLORIDE 600; 310; 30; 20 MG/100ML; MG/100ML; MG/100ML; MG/100ML
125 INJECTION, SOLUTION INTRAVENOUS ONCE
Status: DISCONTINUED | OUTPATIENT
Start: 2023-01-04 | End: 2023-01-04 | Stop reason: HOSPADM

## 2023-01-04 RX ORDER — FENTANYL CITRATE 50 UG/ML
50 INJECTION, SOLUTION INTRAMUSCULAR; INTRAVENOUS
Status: DISCONTINUED | OUTPATIENT
Start: 2023-01-04 | End: 2023-01-04 | Stop reason: HOSPADM

## 2023-01-04 RX ORDER — LIDOCAINE HYDROCHLORIDE 20 MG/ML
INJECTION, SOLUTION EPIDURAL; INFILTRATION; INTRACAUDAL; PERINEURAL AS NEEDED
Status: DISCONTINUED | OUTPATIENT
Start: 2023-01-04 | End: 2023-01-04 | Stop reason: SURG

## 2023-01-04 RX ORDER — MIDAZOLAM HYDROCHLORIDE 1 MG/ML
1 INJECTION INTRAMUSCULAR; INTRAVENOUS
Status: DISCONTINUED | OUTPATIENT
Start: 2023-01-04 | End: 2023-01-04

## 2023-01-04 RX ORDER — SODIUM CHLORIDE 0.9 % (FLUSH) 0.9 %
10 SYRINGE (ML) INJECTION AS NEEDED
Status: DISCONTINUED | OUTPATIENT
Start: 2023-01-04 | End: 2023-01-04 | Stop reason: HOSPADM

## 2023-01-04 RX ORDER — IPRATROPIUM BROMIDE AND ALBUTEROL SULFATE 2.5; .5 MG/3ML; MG/3ML
3 SOLUTION RESPIRATORY (INHALATION) ONCE AS NEEDED
Status: DISCONTINUED | OUTPATIENT
Start: 2023-01-04 | End: 2023-01-04 | Stop reason: HOSPADM

## 2023-01-04 RX ORDER — SODIUM CHLORIDE 0.9 % (FLUSH) 0.9 %
10 SYRINGE (ML) INJECTION EVERY 12 HOURS SCHEDULED
Status: DISCONTINUED | OUTPATIENT
Start: 2023-01-04 | End: 2023-01-04 | Stop reason: HOSPADM

## 2023-01-04 RX ORDER — FAMOTIDINE 10 MG/ML
INJECTION, SOLUTION INTRAVENOUS AS NEEDED
Status: DISCONTINUED | OUTPATIENT
Start: 2023-01-04 | End: 2023-01-04 | Stop reason: SURG

## 2023-01-04 RX ORDER — FENTANYL CITRATE 50 UG/ML
INJECTION, SOLUTION INTRAMUSCULAR; INTRAVENOUS AS NEEDED
Status: DISCONTINUED | OUTPATIENT
Start: 2023-01-04 | End: 2023-01-04 | Stop reason: SURG

## 2023-01-04 RX ORDER — PANTOPRAZOLE SODIUM 40 MG/1
40 TABLET, DELAYED RELEASE ORAL DAILY
Qty: 90 TABLET | Refills: 3 | Status: SHIPPED | OUTPATIENT
Start: 2023-01-04 | End: 2023-02-13 | Stop reason: SDUPTHER

## 2023-01-04 RX ORDER — PROPOFOL 10 MG/ML
INJECTION, EMULSION INTRAVENOUS AS NEEDED
Status: DISCONTINUED | OUTPATIENT
Start: 2023-01-04 | End: 2023-01-04 | Stop reason: SURG

## 2023-01-04 RX ORDER — SODIUM CHLORIDE, SODIUM LACTATE, POTASSIUM CHLORIDE, CALCIUM CHLORIDE 600; 310; 30; 20 MG/100ML; MG/100ML; MG/100ML; MG/100ML
125 INJECTION, SOLUTION INTRAVENOUS ONCE
Status: COMPLETED | OUTPATIENT
Start: 2023-01-04 | End: 2023-01-04

## 2023-01-04 RX ORDER — MEPERIDINE HYDROCHLORIDE 25 MG/ML
12.5 INJECTION INTRAMUSCULAR; INTRAVENOUS; SUBCUTANEOUS
Status: DISCONTINUED | OUTPATIENT
Start: 2023-01-04 | End: 2023-01-04 | Stop reason: HOSPADM

## 2023-01-04 RX ORDER — OXYCODONE HYDROCHLORIDE AND ACETAMINOPHEN 5; 325 MG/1; MG/1
1 TABLET ORAL ONCE AS NEEDED
Status: DISCONTINUED | OUTPATIENT
Start: 2023-01-04 | End: 2023-01-04 | Stop reason: HOSPADM

## 2023-01-04 RX ORDER — ONDANSETRON 2 MG/ML
4 INJECTION INTRAMUSCULAR; INTRAVENOUS AS NEEDED
Status: DISCONTINUED | OUTPATIENT
Start: 2023-01-04 | End: 2023-01-04 | Stop reason: HOSPADM

## 2023-01-04 RX ORDER — MIDAZOLAM HYDROCHLORIDE 1 MG/ML
1 INJECTION INTRAMUSCULAR; INTRAVENOUS
Status: DISCONTINUED | OUTPATIENT
Start: 2023-01-04 | End: 2023-01-04 | Stop reason: HOSPADM

## 2023-01-04 RX ORDER — SODIUM CHLORIDE, SODIUM LACTATE, POTASSIUM CHLORIDE, CALCIUM CHLORIDE 600; 310; 30; 20 MG/100ML; MG/100ML; MG/100ML; MG/100ML
100 INJECTION, SOLUTION INTRAVENOUS ONCE AS NEEDED
Status: DISCONTINUED | OUTPATIENT
Start: 2023-01-04 | End: 2023-01-04 | Stop reason: HOSPADM

## 2023-01-04 RX ADMIN — SUCCINYLCHOLINE CHLORIDE 100 MG: 20 INJECTION, SOLUTION INTRAMUSCULAR; INTRAVENOUS at 11:28

## 2023-01-04 RX ADMIN — FAMOTIDINE 20 MG: 10 INJECTION INTRAVENOUS at 11:26

## 2023-01-04 RX ADMIN — MIDAZOLAM HYDROCHLORIDE 2 MG: 1 INJECTION, SOLUTION INTRAMUSCULAR; INTRAVENOUS at 11:26

## 2023-01-04 RX ADMIN — PROPOFOL 150 MG: 10 INJECTION, EMULSION INTRAVENOUS at 11:28

## 2023-01-04 RX ADMIN — SODIUM CHLORIDE, POTASSIUM CHLORIDE, SODIUM LACTATE AND CALCIUM CHLORIDE: 600; 310; 30; 20 INJECTION, SOLUTION INTRAVENOUS at 11:26

## 2023-01-04 RX ADMIN — LIDOCAINE HYDROCHLORIDE 60 MG: 20 INJECTION, SOLUTION EPIDURAL; INFILTRATION; INTRACAUDAL; PERINEURAL at 11:28

## 2023-01-04 RX ADMIN — FENTANYL CITRATE 100 MCG: 50 INJECTION, SOLUTION INTRAMUSCULAR; INTRAVENOUS at 11:28

## 2023-01-04 NOTE — TELEPHONE ENCOUNTER
Per Dr. Manning Fax face sheet, contact information, insurance information,endoscopy report and CT report to Dr. Bert Ellsworth in [place], [place] [employer] @ [phone]. I faxed everything electronicaly to the above fax number.

## 2023-01-04 NOTE — ANESTHESIA POSTPROCEDURE EVALUATION
Patient: Alan Contreras    Procedure Summary     Date: 01/04/23 Room / Location: [facility] OR  /  [facility] OR    Anesthesia Start: 1125 Anesthesia Stop: 1158    Procedure: ESOPHAGOGASTRODUODENOSCOPY WITH BIOPSY (Esophagus) Diagnosis:       Esophageal dysphagia      (Esophageal dysphagia [R13.19])    Surgeons: Fern Auguste MD Provider: Jamarcus James MD    Anesthesia Type: general ASA Status: 2          Anesthesia Type: general    Vitals  Vitals Value Taken Time   /97 01/04/23 1230   Temp 97 °F (36.1 °C) 01/04/23 1200   Pulse 81 01/04/23 1230   Resp 18 01/04/23 1230   SpO2 98 % 01/04/23 1230           Post Anesthesia Care and Evaluation    Patient location during evaluation: PHASE II  Patient participation: complete - patient participated  Level of consciousness: awake and alert  Pain score: 0  Pain management: adequate    Airway patency: patent  Anesthetic complications: No anesthetic complications  PONV Status: controlled  Cardiovascular status: acceptable  Respiratory status: acceptable and room air  Hydration status: euvolemic  No anesthesia care post op

## 2023-01-04 NOTE — ANESTHESIA PROCEDURE NOTES
Airway  Urgency: elective    Date/Time: 1/4/2023 11:30 AM  Airway not difficult    General Information and Staff    Patient location during procedure: OR    Indications and Patient Condition  Indications for airway management: airway protection    Preoxygenated: yes  Mask difficulty assessment: 0 - not attempted    Final Airway Details  Final airway type: endotracheal airway      Successful airway: ETT  Cuffed: yes   Successful intubation technique: direct laryngoscopy and RSI  Facilitating devices/methods: cricoid pressure and intubating stylet  Endotracheal tube insertion site: oral  Blade: Maria Guadalupe  Blade size: 3  ETT size (mm): 7.5  Cormack-Lehane Classification: grade I - full view of glottis  Placement verified by: chest auscultation, capnometry and palpation of cuff   Measured from: lips  ETT/EBT  to lips (cm): 21  Number of attempts at approach: 1  Assessment: lips, teeth, and gum same as pre-op and atraumatic intubation

## 2023-01-04 NOTE — INTERVAL H&P NOTE
H&P reviewed. The patient was examined and there are no changes to the H&P.  I have personally reviewed the CT scan results which are worrisome for achalasia.  I have discussed this with anesthesia who will likely intubate the patient to protect his airway from possible aspiration.

## 2023-01-04 NOTE — ANESTHESIA PREPROCEDURE EVALUATION
Anesthesia Evaluation     no history of anesthetic complications:  NPO Solid Status: > 8 hours  NPO Liquid Status: > 8 hours           Airway   Mallampati: II  TM distance: >3 FB  Neck ROM: full  No difficulty expected  Dental    (+) poor dentition    Pulmonary - normal exam   (+) a smoker,   Cardiovascular - normal exam        Neuro/Psych  GI/Hepatic/Renal/Endo    (+)  GERD,      Musculoskeletal     Abdominal  - normal exam    Bowel sounds: normal.   Substance History      OB/GYN          Other                        Anesthesia Plan    ASA 2     general     intravenous induction     Anesthetic plan, risks, benefits, and alternatives have been provided, discussed and informed consent has been obtained with: patient.        CODE STATUS:

## 2023-01-05 ENCOUNTER — TELEPHONE (OUTPATIENT)
Dept: GASTROENTEROLOGY | Facility: CLINIC | Age: 38
End: 2023-01-05
Payer: COMMERCIAL

## 2023-01-05 NOTE — TELEPHONE ENCOUNTER
----- Message from Fern Auguste MD sent at 1/4/2023  2:47 PM EST -----  I have talked to Dr. Ellsworth about performing a  POEM procedure (peroral endoscopic myotomy) on this patient for achalasia.  He is going to do the patient's manometry in his office.  He has agreed to see him for probable POEM to relieve his achalasia.  Please fax today's procedure note to Dr. Ellsworth.  The fax number is [phone] please bring this to attention of [employer] who is his nurse.  They will call him and get him scheduled as soon as possible.  It sounds like they want to see him pretty urgently.

## 2023-01-09 ENCOUNTER — APPOINTMENT (OUTPATIENT)
Dept: GENERAL RADIOLOGY | Facility: HOSPITAL | Age: 38
End: 2023-01-09
Payer: COMMERCIAL

## 2023-01-09 ENCOUNTER — OFFICE VISIT (OUTPATIENT)
Dept: FAMILY MEDICINE CLINIC | Facility: CLINIC | Age: 38
End: 2023-01-09
Payer: COMMERCIAL

## 2023-01-09 VITALS
SYSTOLIC BLOOD PRESSURE: 148 MMHG | OXYGEN SATURATION: 99 % | TEMPERATURE: 97.6 F | BODY MASS INDEX: 23.31 KG/M2 | HEART RATE: 118 BPM | HEIGHT: 70 IN | DIASTOLIC BLOOD PRESSURE: 86 MMHG | WEIGHT: 162.8 LBS

## 2023-01-09 DIAGNOSIS — I10 PRIMARY HYPERTENSION: ICD-10-CM

## 2023-01-09 DIAGNOSIS — R13.19 ESOPHAGEAL DYSPHAGIA: ICD-10-CM

## 2023-01-09 DIAGNOSIS — F17.200 CURRENT EVERY DAY SMOKER: Primary | ICD-10-CM

## 2023-01-09 DIAGNOSIS — Z02.9 ADMINISTRATIVE ENCOUNTER: ICD-10-CM

## 2023-01-09 PROCEDURE — 99214 OFFICE O/P EST MOD 30 MIN: CPT | Performed by: FAMILY MEDICINE

## 2023-01-09 RX ORDER — AMLODIPINE BESYLATE 5 MG/1
5 TABLET ORAL DAILY
Qty: 90 TABLET | Refills: 0 | Status: SHIPPED | OUTPATIENT
Start: 2023-01-09 | End: 2023-02-13 | Stop reason: SDUPTHER

## 2023-01-09 RX ORDER — NICOTINE 21 MG/24HR
1 PATCH, TRANSDERMAL 24 HOURS TRANSDERMAL EVERY 24 HOURS
Qty: 28 EACH | Refills: 3 | Status: SHIPPED | OUTPATIENT
Start: 2023-01-09 | End: 2023-02-13

## 2023-01-09 NOTE — PROGRESS NOTES
Subjective   Alan Contreras is a 37 y.o. male.   Pt presents today with CC of Elevated Blood Pressure      History of Present Illness   History of Present Illness  Patient is a 37-year-old male here to follow-up on hypertension.  His blood pressure has been elevated recently during procedures, and during outpatient follow-ups.  He does not currently take anything for blood pressure.  He states that he has been in mild pain, and has had extra anxiety recently due to medical condition.  He has swelling in his distal esophagus associated with dysphagia and achalasia.  He was seen by local GI.  He has an appointment in [place] in the next couple days for evaluation for myotomy procedure.  #2 blood pressure has been elevated on multiple occasions in the past month or so.  He has never been on blood pressure medication before.  #3 he is a current everyday smoker and is interested in trying to quit.  He is agreeable to nicotine patches.         The following portions of the patient's history were reviewed and updated as appropriate: allergies, current medications, past family history, past medical history, past social history, past surgical history and problem list.    Review of Systems   Constitutional: Negative for chills, fever and unexpected weight loss.   HENT: Negative for congestion and sore throat.    Eyes: Negative for blurred vision and visual disturbance.   Respiratory: Negative for cough and wheezing.    Cardiovascular: Negative for chest pain and palpitations.   Gastrointestinal: Negative for abdominal pain and diarrhea.   Endocrine: Negative for cold intolerance and heat intolerance.   Genitourinary: Negative for dysuria.   Musculoskeletal: Negative for arthralgias and neck stiffness.   Neurological: Negative for dizziness, seizures and syncope.   Psychiatric/Behavioral: Negative for self-injury, suicidal ideas and depressed mood.       Objective   Physical Exam  Vitals and nursing note reviewed.    Constitutional:       Appearance: He is well-developed.   HENT:      Head: Normocephalic and atraumatic.      Right Ear: External ear normal.      Left Ear: External ear normal.      Nose: Nose normal.   Eyes:      Conjunctiva/sclera: Conjunctivae normal.      Pupils: Pupils are equal, round, and reactive to light.   Cardiovascular:      Rate and Rhythm: Normal rate and regular rhythm.      Heart sounds: Normal heart sounds.   Pulmonary:      Effort: Pulmonary effort is normal.      Breath sounds: Normal breath sounds.   Abdominal:      General: Bowel sounds are normal.      Palpations: Abdomen is soft.   Musculoskeletal:      Cervical back: Normal range of motion and neck supple.   Skin:     General: Skin is warm and dry.   Neurological:      Mental Status: He is alert and oriented to person, place, and time.   Psychiatric:         Behavior: Behavior normal.           Assessment & Plan   Diagnoses and all orders for this visit:    1. Current every day smoker (Primary)  -     nicotine (Nicoderm CQ) 14 MG/24HR patch; Place 1 patch on the skin as directed by provider Daily.  Dispense: 28 each; Refill: 3  Recommend NicoDerm patches rather than pills as he has dysphagia  2. Primary hypertension  -     amLODIPine (NORVASC) 5 MG tablet; Take 1 tablet by mouth Daily.  Dispense: 90 tablet; Refill: 0  We discussed treatment options.  As amlodipine has been known to help with achalasia, chose this medication.  Recommend follow-up in 4 weeks to recheck blood pressure.  May consider getting blood work at that time if he has not had blood work from his prior procedures  3. Esophageal dysphagia  He has an appointment with surgeon up in [place] in following days  4. Administrative encounter  He will likely need [facility] paperwork because of the work that he is already missed starting from the day he was admitted for his procedure.  He is likely to need as much as 2 days/week for the next couple of months until procedures are  complete and he has recovered.  He may need a continuous period of time off of work after his myotomy procedure.  This has not been scheduled yet.  Estimated time until recovery 2 to 3 months.  We will fill out paperwork when it comes in.  He does plan to continue to work when he can, though he has several specialist appointments coming up that are somewhat unpredictable as have not been scheduled yet.             Alan Contreras  reports that he has been smoking cigarettes. He has a 20.00 pack-year smoking history. His smokeless tobacco use includes snuff.. I have educated him on the risk of diseases from using tobacco products such as cancer, COPD and heart disease.     I advised him to quit and he is willing to quit. We have discussed the following method/s for tobacco cessation:  Prescription Medicaiton.  Together we have set a quit date for 1 week from today.  He will follow up with me in 1 month or sooner to check on his progress.    I spent 3  minutes counseling the patient.         BMI is within normal parameters. No other follow-up for BMI required.

## 2023-01-20 ENCOUNTER — TELEPHONE (OUTPATIENT)
Dept: FAMILY MEDICINE CLINIC | Facility: CLINIC | Age: 38
End: 2023-01-20
Payer: COMMERCIAL

## 2023-01-20 NOTE — TELEPHONE ENCOUNTER
PATIENT MADE CONTACT TO RETURN MISSED CALL FROM OFFICE    HUB RELAYED MESSAGE AS GIVEN:    Fallon Blake     KH     3:54 PM  Note    Called patient to get the name of his employer for paperwork.        HUB to read message.        PATIENT'S EMPLOYER:    [employer]

## 2023-02-13 ENCOUNTER — OFFICE VISIT (OUTPATIENT)
Dept: FAMILY MEDICINE CLINIC | Facility: CLINIC | Age: 38
End: 2023-02-13
Payer: COMMERCIAL

## 2023-02-13 VITALS
HEART RATE: 113 BPM | HEIGHT: 70 IN | SYSTOLIC BLOOD PRESSURE: 136 MMHG | TEMPERATURE: 98 F | BODY MASS INDEX: 23.85 KG/M2 | OXYGEN SATURATION: 100 % | WEIGHT: 166.6 LBS | DIASTOLIC BLOOD PRESSURE: 88 MMHG

## 2023-02-13 DIAGNOSIS — I10 PRIMARY HYPERTENSION: ICD-10-CM

## 2023-02-13 DIAGNOSIS — Z02.9 ADMINISTRATIVE ENCOUNTER: ICD-10-CM

## 2023-02-13 DIAGNOSIS — R13.19 ESOPHAGEAL DYSPHAGIA: Primary | ICD-10-CM

## 2023-02-13 PROCEDURE — 99213 OFFICE O/P EST LOW 20 MIN: CPT | Performed by: FAMILY MEDICINE

## 2023-02-13 RX ORDER — AMLODIPINE BESYLATE 5 MG/1
5 TABLET ORAL DAILY
Qty: 90 TABLET | Refills: 1 | Status: SHIPPED | OUTPATIENT
Start: 2023-02-13

## 2023-02-13 RX ORDER — PANTOPRAZOLE SODIUM 40 MG/1
40 TABLET, DELAYED RELEASE ORAL DAILY
Qty: 90 TABLET | Refills: 1 | Status: SHIPPED | OUTPATIENT
Start: 2023-02-13 | End: 2023-03-03 | Stop reason: SDUPTHER

## 2023-02-13 NOTE — PROGRESS NOTES
Subjective   Alan Contreras is a 37 y.o. male.   Pt presents today with CC of Hypertension      History of Present Illness   History of Present Illness  Patient is a 37-year-old male who was diagnosed with achalasia and is scheduled for surgery in [place] on 20 February.  He has FMLA in place.  At this time, he does not suspect that he needs adjustment made to this.  #2 he still has esophageal dysphagia.  He is doing better with Protonix alone, he is moving forward with surgery  #3 he has hypertension.  He was started on amlodipine 5 mg daily.  He has no concerns with it and it is working well.           The following portions of the patient's history were reviewed and updated as appropriate: allergies, current medications, past family history, past medical history, past social history, past surgical history and problem list.    Review of Systems   Constitutional: Negative for chills, fever and unexpected weight loss.   HENT: Negative for congestion and sore throat.    Eyes: Negative for blurred vision and visual disturbance.   Respiratory: Negative for cough and wheezing.    Cardiovascular: Negative for chest pain and palpitations.   Gastrointestinal: Negative for abdominal pain and diarrhea.   Endocrine: Negative for cold intolerance and heat intolerance.   Genitourinary: Negative for dysuria.   Musculoskeletal: Negative for arthralgias and neck stiffness.   Neurological: Negative for dizziness, seizures and syncope.   Psychiatric/Behavioral: Negative for self-injury, suicidal ideas and depressed mood.       Objective   Physical Exam  Vitals and nursing note reviewed.   Constitutional:       Appearance: He is well-developed.   HENT:      Head: Normocephalic and atraumatic.      Right Ear: External ear normal.      Left Ear: External ear normal.      Nose: Nose normal.   Eyes:      Conjunctiva/sclera: Conjunctivae normal.      Pupils: Pupils are equal, round, and reactive to light.   Cardiovascular:      Rate and  Rhythm: Normal rate and regular rhythm.      Heart sounds: Normal heart sounds.   Pulmonary:      Effort: Pulmonary effort is normal.      Breath sounds: Normal breath sounds.   Abdominal:      General: Bowel sounds are normal.      Palpations: Abdomen is soft.   Musculoskeletal:      Cervical back: Normal range of motion and neck supple.   Skin:     General: Skin is warm and dry.   Neurological:      Mental Status: He is alert and oriented to person, place, and time.   Psychiatric:         Behavior: Behavior normal.           Assessment & Plan   Diagnoses and all orders for this visit:    1. Esophageal dysphagia (Primary)  -     pantoprazole (PROTONIX) 40 MG EC tablet; Take 1 tablet by mouth Daily.  Dispense: 90 tablet; Refill: 1    2. Primary hypertension  -     amLODIPine (NORVASC) 5 MG tablet; Take 1 tablet by mouth Daily.  Dispense: 90 tablet; Refill: 1  He quit smoking 3 weeks ago.  He is doing great  3. Administrative encounter                  BMI is within normal parameters. No other follow-up for BMI required.          This document has been electronically signed by Fallon Blake  February 13, 2023 09:38 EST    Dictated Utilizing Dragon Dictation: Part of this note may be an electronic transcription/translation of spoken language to printed text using the Dragon Dictation System.

## 2023-03-02 NOTE — PROGRESS NOTES
"DATE:  3/3/2023    DIAGNOSIS:    CHIEF COMPLAINT:  Chief Complaint   Patient presents with   • Achalasia     HPI  Difficulty swallowing  The patient reports that he is better today than he has been. He mentions  that since Tuesday morning, 12/20/2022, everything he swallows goes \" right here \" as he points to the cervical esophagus region and it hurts so bad. The patient reports that he is not able to get liquids down easily. He mentions that he can swallow water, but it does not matter what he eats. The patient reports that he had his wife make him eat Tuesday night, 12/20/2022, because he was hungry. He mentions that he can get it down, but he can feel it go down but hang and it just burns. The patient reports that if it is cold, he can just feel it just stop. He mentions that today it does not feel as bad as it has been the last 2 days. The patient reports that he has not been having more heartburn than normal. He mentions that he has had GERD for quite a long time.   He mentions that since Tuesday morning, things have just gotten stuck. The patient reports that his wife bagged him to go to the emergency room because she thought it was his gallbladder. He mentions that he was in excruciating pain. . The patient reports that he woke up Wednesday morning and went on to work. He mentions that he thought it would be better by the morning, but it was still hurting, but not too bad. The patient reports that he went to the urgent care yesterday morning, 12/21/2022, and they checked him and they thought it was his pancreas. He mentions that they sent him to the emergency room. The patient reports that they done all the CT scans and all this stuff. He mentions that they said that his gallbladder and pancreas looked good, but he had a big fluid buildup where he was told that was hurting that. The patient reports that they thought that his esophagus had gotten small to let anything pass on through.  He mentions that he was " supposed to have the EGD with dilation done 4 or 5 years ago. He mentions that he learned what he could eat when he could not.  He mentions that on Tuesday, 12/21/2022, he literally thought he was going to die. The patient reports that it hurts so bad all the way around and in his back. He mentions that it feels like someone just stabbed him sometimes.      Interval History:  Patient states that he had his problem procedure performed roughly 11 days ago by Dr. Ellsworth.  He is doing very well after procedure.  He states that he has noticed a significant improvement in swallowing however has some issues still remaining with breads.  Dr. Ellsworth has transferred care back to us and on note from him will need a follow-up pH Bravo study in roughly 6 months.  He will also continue taking his Protonix 40 mg daily for acid suppression.  He is slowly progressing back to normal diet at this time and not having much difficulty.  He was afraid at first the procedure did not work as planned however believes it to be because he has to put a lot of thought behind swallowing.    The following portions of the patient's history were reviewed and updated as appropriate: allergies, current medications, past family history, past medical history, past social history, past surgical history and problem list.  REVIEW OF SYSTEMS:   Review of Systems   Constitutional: Negative for activity change, appetite change, fatigue and fever.   HENT: Negative for mouth sores, sore throat, trouble swallowing and voice change.    Eyes: Negative.  Negative for visual disturbance.   Respiratory: Negative.  Negative for cough and shortness of breath.    Cardiovascular: Negative.  Negative for chest pain.   Gastrointestinal: Negative for abdominal distention, abdominal pain, anal bleeding, blood in stool, constipation, diarrhea, nausea and vomiting.   Endocrine: Negative.  Negative for cold intolerance and heat intolerance.   Genitourinary: Negative.  Negative for  flank pain and hematuria.   Musculoskeletal: Negative.  Negative for arthralgias and back pain.   Skin: Negative.  Negative for color change.   Allergic/Immunologic: Negative.  Negative for food allergies.   Neurological: Negative.  Negative for syncope and weakness.   Hematological: Negative.    Psychiatric/Behavioral: Negative.  Negative for confusion. The patient is not hyperactive.        PAST MEDICAL HISTORY:  Past Medical History:   Diagnosis Date   • GERD (gastroesophageal reflux disease)        PAST SURGICAL HISTORY:  Past Surgical History:   Procedure Laterality Date   • ENDOSCOPY N/A 2023    Procedure: ESOPHAGOGASTRODUODENOSCOPY WITH BIOPSY;  Surgeon: Fern Auguste MD;  Location: [facility];  Service: Gastroenterology;  Laterality: N/A;       SOCIAL HISTORY:  Social History     Socioeconomic History   • Marital status:    Tobacco Use   • Smoking status: Former     Packs/day: 1.00     Years: 20.00     Pack years: 20.00     Types: Cigarettes     Quit date: 2023     Years since quittin.1   • Smokeless tobacco: Current     Types: Snuff   Vaping Use   • Vaping Use: Never used   Substance and Sexual Activity   • Alcohol use: Yes     Alcohol/week: 18.0 standard drinks     Types: 18 Cans of beer per week   • Drug use: No   • Sexual activity: Defer       FAMILY HISTORY:  Family History   Problem Relation Age of Onset   • Bipolar disorder Mother    • Schizophrenia Mother    • Depression Mother    • Kidney disease Father    • Stroke Paternal Aunt    • Schizophrenia Maternal Grandfather    • Bipolar disorder Maternal Grandfather    • Dementia Paternal Grandmother        MEDICATIONS:      Current Outpatient Medications:   •  amLODIPine (NORVASC) 5 MG tablet, Take 1 tablet by mouth Daily., Disp: 90 tablet, Rfl: 1  •  pantoprazole (PROTONIX) 40 MG EC tablet, Take 1 tablet by mouth Daily., Disp: 90 tablet, Rfl: 3    ALLERGIES:  No Known Allergies    VIST VITALS/PHYSICAL EXAM:  Ht 177.8 cm  "(70\")   Wt 75.2 kg (165 lb 12.8 oz)   BMI 23.79 kg/m²   Physical Exam  Constitutional:       General: He is not in acute distress.     Appearance: Normal appearance. He is well-developed.   HENT:      Head: Normocephalic and atraumatic.   Eyes:      Pupils: Pupils are equal, round, and reactive to light.   Cardiovascular:      Rate and Rhythm: Normal rate and regular rhythm.      Heart sounds: Normal heart sounds.   Pulmonary:      Effort: Pulmonary effort is normal. No respiratory distress.      Breath sounds: Normal breath sounds. No wheezing, rhonchi or rales.   Abdominal:      General: Abdomen is flat. Bowel sounds are normal. There is no distension.      Palpations: Abdomen is soft. There is no mass.      Tenderness: There is no abdominal tenderness. There is no guarding or rebound.      Hernia: No hernia is present.   Musculoskeletal:         General: No swelling. Normal range of motion.      Cervical back: Normal range of motion and neck supple.      Right lower leg: No edema.      Left lower leg: No edema.   Skin:     General: Skin is warm and dry.   Neurological:      Mental Status: He is alert and oriented to person, place, and time.   Psychiatric:         Attention and Perception: Attention normal.         Mood and Affect: Mood normal.         Speech: Speech normal.         Behavior: Behavior normal. Behavior is cooperative.         Thought Content: Thought content normal.           PATHOLOGY:  none      ENDOSCOPY:  UPPER GI ENDOSCOPY (01/04/2023 11:27)        IMAGING:  CT Abdomen Pelvis With Contrast    Result Date: 12/21/2022  1.  Dilated fluid-filled esophagus to the level of GE junction suspicious for achalasia. Upper endoscopy may be indicated to further evaluate. 2.  Mild diffuse fatty infiltration of liver. 3. Other nonacute findings as above.  This report was finalized on 12/21/2022 1:42 PM by Dr. Eliot Montoya MD.      XR Chest 1 View    Result Date: 12/21/2022    Unremarkable exam. No acute " cardiopulmonary findings identified.  This report was finalized on 12/21/2022 11:35 AM by Dr. Eliot Montoya MD.      US Abdomen Limited    Result Date: 12/21/2022    Unremarkable exam. No acute findings sonographically evident.  This report was finalized on 12/21/2022 11:10 AM by Dr. Eliot Montoya MD.            RECENT LABS:  Lab Results   Component Value Date    WBC 11.41 (H) 12/21/2022    HGB 16.9 12/21/2022    HCT 47.8 12/21/2022    MCV 93.5 12/21/2022    RDW 11.9 (L) 12/21/2022     12/21/2022    NEUTRORELPCT 70.2 12/21/2022    LYMPHORELPCT 20.9 12/21/2022    MONORELPCT 6.0 12/21/2022    EOSRELPCT 1.6 12/21/2022    BASORELPCT 0.9 12/21/2022    NEUTROABS 7.7 (H) 02/20/2023    LYMPHSABS 2.39 12/21/2022       Lab Results   Component Value Date     12/21/2022    K 4.1 12/21/2022    CO2 29.3 (H) 12/21/2022     12/21/2022    BUN 8 12/21/2022    CREATININE 0.99 12/21/2022    EGFRIFNONA 107 11/02/2020    GLUCOSE 105 (H) 12/21/2022    CALCIUM 9.9 12/21/2022    ALKPHOS 85 12/21/2022    AST 29 12/21/2022    ALT 49 (H) 12/21/2022    BILITOT 0.6 12/21/2022    ALBUMIN 4.48 12/21/2022    PROTEINTOT 7.8 12/21/2022    MG 2.2 12/21/2022             ASSESSMENT & PLAN:  Some notes reviewed from Dr. Ellsworth about Vilma procedure.  Patient is doing good after procedure and will continue progressing diet.  He will continue on Protonix 40 mg once daily for GERD.  We will schedule with general surgery in 6 months for pH Bravo study.   Diagnosis Plan   1. Esophageal dysphagia  pantoprazole (PROTONIX) 40 MG EC tablet          Return in about 6 months (around 9/3/2023).        Electronically Signed by: Jose Armando Layton PA-C ,  March 3, 2023 10:41 EST       CC:   Rayray Telles DO

## 2023-03-03 ENCOUNTER — OFFICE VISIT (OUTPATIENT)
Dept: GASTROENTEROLOGY | Facility: CLINIC | Age: 38
End: 2023-03-03
Payer: COMMERCIAL

## 2023-03-03 VITALS — BODY MASS INDEX: 23.74 KG/M2 | HEIGHT: 70 IN | WEIGHT: 165.8 LBS

## 2023-03-03 DIAGNOSIS — R13.19 ESOPHAGEAL DYSPHAGIA: ICD-10-CM

## 2023-03-03 PROCEDURE — 99213 OFFICE O/P EST LOW 20 MIN: CPT | Performed by: PHYSICIAN ASSISTANT

## 2023-03-03 RX ORDER — PANTOPRAZOLE SODIUM 40 MG/1
40 TABLET, DELAYED RELEASE ORAL DAILY
Qty: 90 TABLET | Refills: 3 | Status: SHIPPED | OUTPATIENT
Start: 2023-03-03

## 2025-04-07 ENCOUNTER — OFFICE VISIT (OUTPATIENT)
Dept: UROLOGY | Facility: CLINIC | Age: 40
End: 2025-04-07
Payer: COMMERCIAL

## 2025-04-07 VITALS
DIASTOLIC BLOOD PRESSURE: 101 MMHG | HEIGHT: 70 IN | BODY MASS INDEX: 24.85 KG/M2 | HEART RATE: 93 BPM | WEIGHT: 173.6 LBS | SYSTOLIC BLOOD PRESSURE: 162 MMHG

## 2025-04-07 DIAGNOSIS — Z30.09 VASECTOMY EVALUATION: Primary | ICD-10-CM

## 2025-04-07 PROCEDURE — 99203 OFFICE O/P NEW LOW 30 MIN: CPT

## 2025-04-07 RX ORDER — CEPHALEXIN 500 MG/1
500 CAPSULE ORAL 2 TIMES DAILY
Qty: 8 CAPSULE | Refills: 0 | Status: SHIPPED | OUTPATIENT
Start: 2025-04-07 | End: 2025-04-11

## 2025-04-07 RX ORDER — DIAZEPAM 10 MG/1
TABLET ORAL
Qty: 2 TABLET | Refills: 0 | Status: SHIPPED | OUTPATIENT
Start: 2025-04-07

## 2025-04-07 NOTE — PROGRESS NOTES
"Chief Complaint:    Chief Complaint   Patient presents with    Sterilization     New patient / Vasectomy consult        Vital Signs:   BP (!) 162/101 (BP Location: Right arm, Patient Position: Sitting, Cuff Size: Large Adult)   Pulse 93   Ht 177.8 cm (70\")   Wt 78.7 kg (173 lb 9.6 oz)   BMI 24.91 kg/m²   Body mass index is 24.91 kg/m².      HPI:  Alan Contreras is a 39 y.o. male who presents today for initial evaluation     History of Present Illness  Mr. Contreras presents to the clinic for evaluation of elective scrotal vasectomy.  He is referred himself.  He has fathered 4 children in the past.  Both he and his current partner wish to proceed forth elective scrotal vasectomy.  He denies any scrotal pain, testicular pain, scrotal swelling, or lower urinary tract symptoms.      Past Medical History:  Past Medical History:   Diagnosis Date    GERD (gastroesophageal reflux disease)        Current Meds:  Current Outpatient Medications   Medication Sig Dispense Refill    pantoprazole (PROTONIX) 40 MG EC tablet Take 1 tablet by mouth Daily. 90 tablet 3    cephalexin (KEFLEX) 500 MG capsule Take 1 capsule by mouth 2 (Two) Times a Day for 4 days. Start medication day before procedure 8 capsule 0    diazePAM (VALIUM) 10 MG tablet Use one tablet night before procedure at bedtime and morning of one hour prior to procedure 2 tablet 0     No current facility-administered medications for this visit.        Allergies:   No Known Allergies     Past Surgical History:  Past Surgical History:   Procedure Laterality Date    ENDOSCOPY N/A 01/04/2023    Procedure: ESOPHAGOGASTRODUODENOSCOPY WITH BIOPSY;  Surgeon: Fern Auguste MD;  Location: [facility];  Service: Gastroenterology;  Laterality: N/A;    THROAT SURGERY         Social History:  Social History     Socioeconomic History    Marital status:    Tobacco Use    Smoking status: Every Day     Current packs/day: 0.00     Average packs/day: 1 pack/day for 20.0 years " (20.0 ttl pk-yrs)     Types: Cigarettes     Start date: 2003     Last attempt to quit: 2023     Years since quittin.2    Smokeless tobacco: Current     Types: Snuff   Vaping Use    Vaping status: Never Used   Substance and Sexual Activity    Alcohol use: Yes     Alcohol/week: 18.0 standard drinks of alcohol     Types: 18 Cans of beer per week    Drug use: No    Sexual activity: Defer       Family History:  Family History   Problem Relation Age of Onset    Bipolar disorder Mother     Schizophrenia Mother     Depression Mother     Kidney disease Father     Stroke Paternal Aunt     Schizophrenia Maternal Grandfather     Bipolar disorder Maternal Grandfather     Dementia Paternal Grandmother        Review of Systems:  Review of Systems   Constitutional:  Negative for chills, fatigue, fever and unexpected weight change.   HENT:  Negative for congestion and sinus pressure.    Respiratory:  Negative for chest tightness and shortness of breath.    Cardiovascular:  Negative for chest pain.   Gastrointestinal:  Negative for abdominal pain, constipation, diarrhea, nausea and vomiting.   Genitourinary:  Negative for difficulty urinating, dysuria, flank pain, frequency, hematuria and urgency.   Musculoskeletal:  Negative for back pain and neck pain.   Skin:  Negative for rash.   Allergic/Immunologic: Negative for food allergies.   Neurological:  Negative for dizziness and headaches.   Hematological:  Does not bruise/bleed easily.   Psychiatric/Behavioral:  Negative for confusion and suicidal ideas. The patient is not nervous/anxious.        Physical Exam:  Physical Exam  Constitutional:       General: He is not in acute distress.     Appearance: Normal appearance.   HENT:      Head: Normocephalic and atraumatic.      Nose: Nose normal.      Mouth/Throat:      Mouth: Mucous membranes are moist.   Eyes:      Conjunctiva/sclera: Conjunctivae normal.   Cardiovascular:      Rate and Rhythm: Normal rate.      Pulses:  Normal pulses.   Pulmonary:      Effort: Pulmonary effort is normal.   Abdominal:      Palpations: Abdomen is soft.   Genitourinary:     Penis: Normal.       Testes: Normal.   Musculoskeletal:         General: Normal range of motion.      Cervical back: Normal range of motion.   Skin:     General: Skin is warm.   Neurological:      General: No focal deficit present.      Mental Status: He is alert and oriented to person, place, and time.   Psychiatric:         Mood and Affect: Mood normal.         Behavior: Behavior normal.         Thought Content: Thought content normal.         Judgment: Judgment normal.             Recent Image (CT and/or KUB):   CT Abdomen and Pelvis: No results found for this or any previous visit.     CT Stone Protocol: No results found for this or any previous visit.     KUB: No results found for this or any previous visit.       Labs:  Brief Urine Lab Results       None          No visits with results within 3 Month(s) from this visit.   Latest known visit with results is:   Admission on 12/21/2022, Discharged on 12/21/2022   Component Date Value Ref Range Status    Extra Tube 12/21/2022 Hold for add-ons.   Final    Auto resulted.    Extra Tube 12/21/2022 hold for add-on   Final    Auto resulted    Extra Tube 12/21/2022 Hold for add-ons.   Final    Auto resulted.    Extra Tube 12/21/2022 Hold for add-ons.   Final    Auto resulted    Color, UA 12/21/2022 Yellow  Yellow, Straw Final    Appearance, UA 12/21/2022 Clear  Clear Final    pH, UA 12/21/2022 7.5  5.0 - 8.0 Final    Specific Gravity, UA 12/21/2022 1.007  1.005 - 1.030 Final    Glucose, UA 12/21/2022 Negative  Negative Final    Ketones, UA 12/21/2022 Negative  Negative Final    Bilirubin, UA 12/21/2022 Negative  Negative Final    Blood, UA 12/21/2022 Negative  Negative Final    Protein, UA 12/21/2022 Negative  Negative Final    Leuk Esterase, UA 12/21/2022 Negative  Negative Final    Nitrite, UA 12/21/2022 Negative  Negative Final     Urobilinogen, UA 12/21/2022 0.2 E.U./dL  0.2 - 1.0 E.U./dL Final    Troponin T 12/21/2022 <0.010  0.000 - 0.030 ng/mL Final    Lactate 12/21/2022 2.0  0.5 - 2.0 mmol/L Final    THC, Screen, Urine 12/21/2022 Negative  Negative Final    Phencyclidine (PCP), Urine 12/21/2022 Negative  Negative Final    Cocaine Screen, Urine 12/21/2022 Negative  Negative Final    Methamphetamine, Ur 12/21/2022 Negative  Negative Final    Opiate Screen 12/21/2022 Negative  Negative Final    Amphetamine Screen, Urine 12/21/2022 Negative  Negative Final    Benzodiazepine Screen, Urine 12/21/2022 Negative  Negative Final    Tricyclic Antidepressants Screen 12/21/2022 Negative  Negative Final    Methadone Screen, Urine 12/21/2022 Negative  Negative Final    Barbiturates Screen, Urine 12/21/2022 Negative  Negative Final    Oxycodone Screen, Urine 12/21/2022 Negative  Negative Final    Propoxyphene Screen 12/21/2022 Negative  Negative Final    Buprenorphine, Screen, Urine 12/21/2022 Negative  Negative Final    QT Interval 12/21/2022 366  ms Final    QTC Interval 12/21/2022 430  ms Final    COVID19 12/21/2022 Not Detected  Not Detected - Ref. Range Final    Influenza A PCR 12/21/2022 Not Detected  Not Detected Final    Influenza B PCR 12/21/2022 Not Detected  Not Detected Final    Amylase 12/21/2022 64  28 - 100 U/L Final    C-Reactive Protein 12/21/2022 1.21 (H)  0.00 - 0.50 mg/dL Final    Glucose 12/21/2022 105 (H)  65 - 99 mg/dL Final    BUN 12/21/2022 8  6 - 20 mg/dL Final    Creatinine 12/21/2022 0.99  0.76 - 1.27 mg/dL Final    Sodium 12/21/2022 141  136 - 145 mmol/L Final    Potassium 12/21/2022 4.1  3.5 - 5.2 mmol/L Final    Chloride 12/21/2022 103  98 - 107 mmol/L Final    CO2 12/21/2022 29.3 (H)  22.0 - 29.0 mmol/L Final    Calcium 12/21/2022 9.9  8.6 - 10.5 mg/dL Final    Total Protein 12/21/2022 7.8  6.0 - 8.5 g/dL Final    Albumin 12/21/2022 4.48  3.50 - 5.20 g/dL Final    ALT (SGPT) 12/21/2022 49 (H)  1 - 41 U/L Final    AST  (SGOT) 12/21/2022 29  1 - 40 U/L Final    Alkaline Phosphatase 12/21/2022 85  39 - 117 U/L Final    Total Bilirubin 12/21/2022 0.6  0.0 - 1.2 mg/dL Final    Globulin 12/21/2022 3.3  gm/dL Final    A/G Ratio 12/21/2022 1.3  g/dL Final    BUN/Creatinine Ratio 12/21/2022 8.1  7.0 - 25.0 Final    Anion Gap 12/21/2022 8.7  5.0 - 15.0 mmol/L Final    eGFR 12/21/2022 100.0  >60.0 mL/min/1.73 Final    National Kidney Foundation and American Society of Nephrology (ASN) Task Force recommended calculation based on the Chronic Kidney Disease Epidemiology Collaboration (CKD-EPI) equation refit without adjustment for race.    Ethanol 12/21/2022 10  0 - 10 mg/dL Final    Ethanol % 12/21/2022 0.010  % Final    Lipase 12/21/2022 32  13 - 60 U/L Final    Magnesium 12/21/2022 2.2  1.6 - 2.6 mg/dL Final    WBC 12/21/2022 11.41 (H)  3.40 - 10.80 10*3/mm3 Final    RBC 12/21/2022 5.11  4.14 - 5.80 10*6/mm3 Final    Hemoglobin 12/21/2022 16.9  13.0 - 17.7 g/dL Final    Hematocrit 12/21/2022 47.8  37.5 - 51.0 % Final    MCV 12/21/2022 93.5  79.0 - 97.0 fL Final    MCH 12/21/2022 33.1 (H)  26.6 - 33.0 pg Final    MCHC 12/21/2022 35.4  31.5 - 35.7 g/dL Final    RDW 12/21/2022 11.9 (L)  12.3 - 15.4 % Final    RDW-SD 12/21/2022 41.1  37.0 - 54.0 fl Final    MPV 12/21/2022 10.2  6.0 - 12.0 fL Final    Platelets 12/21/2022 288  140 - 450 10*3/mm3 Final    Neutrophil % 12/21/2022 70.2  42.7 - 76.0 % Final    Lymphocyte % 12/21/2022 20.9  19.6 - 45.3 % Final    Monocyte % 12/21/2022 6.0  5.0 - 12.0 % Final    Eosinophil % 12/21/2022 1.6  0.3 - 6.2 % Final    Basophil % 12/21/2022 0.9  0.0 - 1.5 % Final    Immature Grans % 12/21/2022 0.4  0.0 - 0.5 % Final    Neutrophils, Absolute 12/21/2022 8.01 (H)  1.70 - 7.00 10*3/mm3 Final    Lymphocytes, Absolute 12/21/2022 2.39  0.70 - 3.10 10*3/mm3 Final    Monocytes, Absolute 12/21/2022 0.69  0.10 - 0.90 10*3/mm3 Final    Eosinophils, Absolute 12/21/2022 0.18  0.00 - 0.40 10*3/mm3 Final    Basophils,  Absolute 12/21/2022 0.10  0.00 - 0.20 10*3/mm3 Final    Immature Grans, Absolute 12/21/2022 0.04  0.00 - 0.05 10*3/mm3 Final    nRBC 12/21/2022 0.0  0.0 - 0.2 /100 WBC Final    Troponin T 12/21/2022 <0.010  0.000 - 0.030 ng/mL Final        Procedure: None  Procedures     Assessment/Plan:   Problem List Items Addressed This Visit       Vasectomy evaluation - Primary    Relevant Medications    cephalexin (KEFLEX) 500 MG capsule    diazePAM (VALIUM) 10 MG tablet       Health Maintenance:   Health Maintenance Due   Topic Date Due    Pneumococcal Vaccine 0-49 (1 of 2 - PCV) Never done    TDAP/TD VACCINES (1 - Tdap) Never done    HEPATITIS C SCREENING  Never done    ANNUAL PHYSICAL  11/02/2021    COVID-19 Vaccine (1 - 2024-25 season) Never done        Smoking Counseling: Everyday smoker.  Current user of smokeless tobacco.  Counseling given however not ready to quit at this time.    Patient was given instructions and counseling regarding his condition or for health maintenance advice. Please see specific information pulled into the AVS if appropriate.    Patient Education:   Mr. Contreras presents for consideration of an elective scrotal vasectomy.  I discussed the procedure at length including the risks of local anesthesia, bleeding, infection, testicular pain postoperatively, and a very low chance of long-term testicular pain.  He was informed of the rates of surgical complications such as a symptomatic hematoma and infection in the range of 1 to 2% and clinically from the risk of chronic significant scrotal pain in the range of 1 to 2%.  The patient was informed of a possible failure rate in the range of 1 in 10,000 and the important necessity to have a follow-up in about 8 weeks after the original procedure to be sure that the semen specimen is free of spermatozoa, thus ensuring sterility.  I discussed the various techniques out there including a 1 incision, 2 incision technique as well.  They understand that we will be  giving local anesthesia and a mild antibiotic to take in the immediate perioperative period.  If he cannot tolerate the anesthesia for a variety of reasons including body habitus., we are glad to perform it under an anesthetic.  We discussed the technique of reversal when indicated including the approximate rate of 57% and the importance that this is strongly related to the time from the original vasectomy.  However, I stressed the fact that if they are even considering children in the future they should not use this as a form of temporary contraception. Patients can stop contraception once postvasectomy semen specimens show azoospermia or only rare nonmotile spermatozoa in the range of less than 100,000 sperm per mLnts.  Physical exam today is unremarkable.  I will send in cephalexin for infection prophylaxis to start the day prior to surgical intervention I will also send in Valium for preop use as well.  Otherwise we will see him back in office.    Visit Diagnoses:    ICD-10-CM ICD-9-CM   1. Vasectomy evaluation  Z30.09 V25.09     A total of 30 minutes were spent coordinating this patient’s care in clinic today; 15 minutes of which were face-to-face with the patient, reviewing medical history and counseling on the current treatment and followup plan.  All questions were answered to patient's satisfaction.    Meds Ordered During Visit:  New Medications Ordered This Visit   Medications    cephalexin (KEFLEX) 500 MG capsule     Sig: Take 1 capsule by mouth 2 (Two) Times a Day for 4 days. Start medication day before procedure     Dispense:  8 capsule     Refill:  0    diazePAM (VALIUM) 10 MG tablet     Sig: Use one tablet night before procedure at bedtime and morning of one hour prior to procedure     Dispense:  2 tablet     Refill:  0       Follow Up Appointment: Vasectomy  No follow-ups on file.      This document has been electronically signed by Carlos Zamora PA-C   April 7, 2025 17:24 EDT    Part of this note  may be an electronic transcription/translation of spoken language to printed text using the Dragon Dictation System.

## 2025-04-10 ENCOUNTER — PATIENT ROUNDING (BHMG ONLY) (OUTPATIENT)
Dept: UROLOGY | Facility: CLINIC | Age: 40
End: 2025-04-10
Payer: COMMERCIAL

## 2025-06-12 ENCOUNTER — PROCEDURE VISIT (OUTPATIENT)
Dept: UROLOGY | Facility: CLINIC | Age: 40
End: 2025-06-12
Payer: COMMERCIAL

## 2025-06-12 VITALS
HEART RATE: 110 BPM | BODY MASS INDEX: 24.25 KG/M2 | DIASTOLIC BLOOD PRESSURE: 104 MMHG | HEIGHT: 70 IN | WEIGHT: 169.4 LBS | SYSTOLIC BLOOD PRESSURE: 156 MMHG

## 2025-06-12 DIAGNOSIS — Z30.09 VASECTOMY EVALUATION: Primary | ICD-10-CM

## 2025-06-12 RX ORDER — HYDROCODONE BITARTRATE AND ACETAMINOPHEN 10; 325 MG/1; MG/1
1 TABLET ORAL EVERY 6 HOURS PRN
Qty: 10 TABLET | Refills: 0 | Status: SHIPPED | OUTPATIENT
Start: 2025-06-12

## 2025-06-12 NOTE — PROGRESS NOTES
Chief Complaint:      Chief Complaint   Patient presents with    Sterilization     Vasectomy        HPI:   39 y.o. male vasectomy status-patient presents for consideration of an elective scrotal vasectomy.  I discussed the procedure at length.  I discussed the fact that it has risks of local anesthesia, bleeding, infection, testicular pain postoperatively shortly, and a very low chance of long-term testicular pain.  Discussed the rates of surgical complications such as a symptomatic hematoma and infection in the range of 1 to 2% and clinically from the risk of chronic significant scrotal pain in the range of 1 to 2%.  Discussed the failure rate in the range of 1 in 10,000 and the important necessity to have a follow-up in about 8 weeks after the original procedure to be sure that the semen specimen is free of spermatozoa, thus ensuring sterility.  I discussed the various techniques out there including a 1 incision, 2 incision technique as well.  They understand that we will be giving local anesthesia with Valium the night before and the morning of and a mild antibiotic to take in the immediate perioperative period.  If he cannot tolerate the anesthesia for a variety of reasons including body habitus., we are glad to perform it under an anesthetic.  We discussed the technique of reversal when indicated including the approximate rate of 57% and the importance that this is strongly related to the time from the original vasectomy.  However, I stressed the fact that if they are even considering children in the future they should not use this as a form of temporary contraception patients can stop using contraception 1 postvasectomy semen specimens show azoospermia or only rare nonmotile spermatozoa in the range of less than 100,000 sperm per mL      Past Medical History:     Past Medical History:   Diagnosis Date    GERD (gastroesophageal reflux disease)        Current Meds:     Current Outpatient Medications   Medication  Sig Dispense Refill    diazePAM (VALIUM) 10 MG tablet Use one tablet night before procedure at bedtime and morning of one hour prior to procedure 2 tablet 0    pantoprazole (PROTONIX) 40 MG EC tablet Take 1 tablet by mouth Daily. 90 tablet 3     No current facility-administered medications for this visit.        Allergies:      No Known Allergies     Past Surgical History:     Past Surgical History:   Procedure Laterality Date    ENDOSCOPY N/A 2023    Procedure: ESOPHAGOGASTRODUODENOSCOPY WITH BIOPSY;  Surgeon: Fern Auguste MD;  Location: [facility];  Service: Gastroenterology;  Laterality: N/A;    THROAT SURGERY         Social History:     Social History     Socioeconomic History    Marital status:    Tobacco Use    Smoking status: Every Day     Current packs/day: 0.00     Average packs/day: 1 pack/day for 20.0 years (20.0 ttl pk-yrs)     Types: Cigarettes     Start date: 2003     Last attempt to quit: 2023     Years since quittin.3    Smokeless tobacco: Current     Types: Snuff   Vaping Use    Vaping status: Never Used   Substance and Sexual Activity    Alcohol use: Yes     Alcohol/week: 18.0 standard drinks of alcohol     Types: 18 Cans of beer per week    Drug use: No    Sexual activity: Defer       Family History:     Family History   Problem Relation Age of Onset    Bipolar disorder Mother     Schizophrenia Mother     Depression Mother     Kidney disease Father     Stroke Paternal Aunt     Schizophrenia Maternal Grandfather     Bipolar disorder Maternal Grandfather     Dementia Paternal Grandmother        Review of Systems:     Review of Systems   Constitutional: Negative.    HENT: Negative.     Eyes: Negative.    Respiratory: Negative.     Cardiovascular: Negative.    Gastrointestinal: Negative.    Endocrine: Negative.    Musculoskeletal: Negative.    Allergic/Immunologic: Negative.    Neurological: Negative.    Hematological: Negative.    Psychiatric/Behavioral:  Negative.         Physical Exam:     Physical Exam  Vitals and nursing note reviewed.   Constitutional:       Appearance: He is well-developed.   HENT:      Head: Normocephalic and atraumatic.   Eyes:      Conjunctiva/sclera: Conjunctivae normal.      Pupils: Pupils are equal, round, and reactive to light.   Cardiovascular:      Rate and Rhythm: Normal rate and regular rhythm.      Heart sounds: Normal heart sounds.   Pulmonary:      Effort: Pulmonary effort is normal.      Breath sounds: Normal breath sounds.   Abdominal:      General: Bowel sounds are normal.      Palpations: Abdomen is soft.   Musculoskeletal:         General: Normal range of motion.      Cervical back: Normal range of motion.   Skin:     General: Skin is warm and dry.   Neurological:      Mental Status: He is alert and oriented to person, place, and time.      Deep Tendon Reflexes: Reflexes are normal and symmetric.   Psychiatric:         Behavior: Behavior normal.         Thought Content: Thought content normal.         Judgment: Judgment normal.         I have reviewed the following portions of the patient's history: Allergies, current medications, past family history, past medical history, past social history, past surgical history, problem list, and ROS and confirm it is accurate.    Recent Image (CT and/or KUB):      CT Abdomen and Pelvis: No results found for this or any previous visit.       CT Stone Protocol: No results found for this or any previous visit.       KUB: No results found for this or any previous visit.       Labs (past 3 months):      No visits with results within 3 Month(s) from this visit.   Latest known visit with results is:   Admission on 12/21/2022, Discharged on 12/21/2022   Component Date Value Ref Range Status    Extra Tube 12/21/2022 Hold for add-ons.   Final    Auto resulted.    Extra Tube 12/21/2022 hold for add-on   Final    Auto resulted    Extra Tube 12/21/2022 Hold for add-ons.   Final    Auto resulted.     Extra Tube 12/21/2022 Hold for add-ons.   Final    Auto resulted    Color, UA 12/21/2022 Yellow  Yellow, Straw Final    Appearance, UA 12/21/2022 Clear  Clear Final    pH, UA 12/21/2022 7.5  5.0 - 8.0 Final    Specific Gravity, UA 12/21/2022 1.007  1.005 - 1.030 Final    Glucose, UA 12/21/2022 Negative  Negative Final    Ketones, UA 12/21/2022 Negative  Negative Final    Bilirubin, UA 12/21/2022 Negative  Negative Final    Blood, UA 12/21/2022 Negative  Negative Final    Protein, UA 12/21/2022 Negative  Negative Final    Leuk Esterase, UA 12/21/2022 Negative  Negative Final    Nitrite, UA 12/21/2022 Negative  Negative Final    Urobilinogen, UA 12/21/2022 0.2 E.U./dL  0.2 - 1.0 E.U./dL Final    Troponin T 12/21/2022 <0.010  0.000 - 0.030 ng/mL Final    Lactate 12/21/2022 2.0  0.5 - 2.0 mmol/L Final    THC, Screen, Urine 12/21/2022 Negative  Negative Final    Phencyclidine (PCP), Urine 12/21/2022 Negative  Negative Final    Cocaine Screen, Urine 12/21/2022 Negative  Negative Final    Methamphetamine, Ur 12/21/2022 Negative  Negative Final    Opiate Screen 12/21/2022 Negative  Negative Final    Amphetamine Screen, Urine 12/21/2022 Negative  Negative Final    Benzodiazepine Screen, Urine 12/21/2022 Negative  Negative Final    Tricyclic Antidepressants Screen 12/21/2022 Negative  Negative Final    Methadone Screen, Urine 12/21/2022 Negative  Negative Final    Barbiturates Screen, Urine 12/21/2022 Negative  Negative Final    Oxycodone Screen, Urine 12/21/2022 Negative  Negative Final    Propoxyphene Screen 12/21/2022 Negative  Negative Final    Buprenorphine, Screen, Urine 12/21/2022 Negative  Negative Final    QT Interval 12/21/2022 366  ms Final    QTC Interval 12/21/2022 430  ms Final    COVID19 12/21/2022 Not Detected  Not Detected - Ref. Range Final    Influenza A PCR 12/21/2022 Not Detected  Not Detected Final    Influenza B PCR 12/21/2022 Not Detected  Not Detected Final    Amylase 12/21/2022 64  28 - 100 U/L Final     C-Reactive Protein 12/21/2022 1.21 (H)  0.00 - 0.50 mg/dL Final    Glucose 12/21/2022 105 (H)  65 - 99 mg/dL Final    BUN 12/21/2022 8  6 - 20 mg/dL Final    Creatinine 12/21/2022 0.99  0.76 - 1.27 mg/dL Final    Sodium 12/21/2022 141  136 - 145 mmol/L Final    Potassium 12/21/2022 4.1  3.5 - 5.2 mmol/L Final    Chloride 12/21/2022 103  98 - 107 mmol/L Final    CO2 12/21/2022 29.3 (H)  22.0 - 29.0 mmol/L Final    Calcium 12/21/2022 9.9  8.6 - 10.5 mg/dL Final    Total Protein 12/21/2022 7.8  6.0 - 8.5 g/dL Final    Albumin 12/21/2022 4.48  3.50 - 5.20 g/dL Final    ALT (SGPT) 12/21/2022 49 (H)  1 - 41 U/L Final    AST (SGOT) 12/21/2022 29  1 - 40 U/L Final    Alkaline Phosphatase 12/21/2022 85  39 - 117 U/L Final    Total Bilirubin 12/21/2022 0.6  0.0 - 1.2 mg/dL Final    Globulin 12/21/2022 3.3  gm/dL Final    A/G Ratio 12/21/2022 1.3  g/dL Final    BUN/Creatinine Ratio 12/21/2022 8.1  7.0 - 25.0 Final    Anion Gap 12/21/2022 8.7  5.0 - 15.0 mmol/L Final    eGFR 12/21/2022 100.0  >60.0 mL/min/1.73 Final    National Kidney Foundation and American Society of Nephrology (ASN) Task Force recommended calculation based on the Chronic Kidney Disease Epidemiology Collaboration (CKD-EPI) equation refit without adjustment for race.    Ethanol 12/21/2022 10  0 - 10 mg/dL Final    Ethanol % 12/21/2022 0.010  % Final    Lipase 12/21/2022 32  13 - 60 U/L Final    Magnesium 12/21/2022 2.2  1.6 - 2.6 mg/dL Final    WBC 12/21/2022 11.41 (H)  3.40 - 10.80 10*3/mm3 Final    RBC 12/21/2022 5.11  4.14 - 5.80 10*6/mm3 Final    Hemoglobin 12/21/2022 16.9  13.0 - 17.7 g/dL Final    Hematocrit 12/21/2022 47.8  37.5 - 51.0 % Final    MCV 12/21/2022 93.5  79.0 - 97.0 fL Final    MCH 12/21/2022 33.1 (H)  26.6 - 33.0 pg Final    MCHC 12/21/2022 35.4  31.5 - 35.7 g/dL Final    RDW 12/21/2022 11.9 (L)  12.3 - 15.4 % Final    RDW-SD 12/21/2022 41.1  37.0 - 54.0 fl Final    MPV 12/21/2022 10.2  6.0 - 12.0 fL Final    Platelets 12/21/2022 288   140 - 450 10*3/mm3 Final    Neutrophil % 12/21/2022 70.2  42.7 - 76.0 % Final    Lymphocyte % 12/21/2022 20.9  19.6 - 45.3 % Final    Monocyte % 12/21/2022 6.0  5.0 - 12.0 % Final    Eosinophil % 12/21/2022 1.6  0.3 - 6.2 % Final    Basophil % 12/21/2022 0.9  0.0 - 1.5 % Final    Immature Grans % 12/21/2022 0.4  0.0 - 0.5 % Final    Neutrophils, Absolute 12/21/2022 8.01 (H)  1.70 - 7.00 10*3/mm3 Final    Lymphocytes, Absolute 12/21/2022 2.39  0.70 - 3.10 10*3/mm3 Final    Monocytes, Absolute 12/21/2022 0.69  0.10 - 0.90 10*3/mm3 Final    Eosinophils, Absolute 12/21/2022 0.18  0.00 - 0.40 10*3/mm3 Final    Basophils, Absolute 12/21/2022 0.10  0.00 - 0.20 10*3/mm3 Final    Immature Grans, Absolute 12/21/2022 0.04  0.00 - 0.05 10*3/mm3 Final    nRBC 12/21/2022 0.0  0.0 - 0.2 /100 WBC Final    Troponin T 12/21/2022 <0.010  0.000 - 0.030 ng/mL Final        Procedure:   Elective scrotal vasectomy -After an appropriate informed consent including the risks of anesthesia, infection, scrotal hematoma, failure in the range of 1 in 10,000, chronic testalgia, low testosterone, as well as the other very rare complications identified.  He was brought to the operative suite.  His scrotum was shaved and prepped in a sterile fashion using Betadine.  Local anesthetic was infiltrated into the midline scrotal raphae.  A midline scrotal incision was made and the right vas entrapped.  I anesthetized the spermatic cord and skin using 5 mL of 1% Xylocaine with epinephrine after an adequate period of analgesia.  I identified the vas and brought out into the incision.  The fascia was divided.  The vas was then doubly ligated, fulgurated, and sewn in the sheath away from the proximal end.  Bovie electrocautery had been used to fulgurate the end of both vasa as per the AUA guidelines.  Hemostasis was excellent and it was allowed to fall back in the scrotal sac.  The identical procedure was done on the contralateral side.  The skin was closed  with a 3-0 chromic sutures.  Hemostasis was excellent.  He was recommended to use ice pack with a shield covering the scrotum to protect it from the ice.  He was given pain medication and antibiotics.  The incision was covered with bacitracin ointment.  The patient will be seen in 8 weeks whereupon we will then check a semen analysis to confirm the absence of spermatozoa and therby declare sterility.    Assessment/Plan:   Vasectomy status-patient presents for consideration of an elective scrotal vasectomy.  I discussed the procedure at length.  I discussed the fact that it has risks of local anesthesia, bleeding, infection, testicular pain postoperatively shortly, and a very low chance of long-term testicular pain.  Discussed the rates of surgical complications such as a symptomatic hematoma and infection in the range of 1 to 2% and clinically from the risk of chronic significant scrotal pain in the range of 1 to 2%.  Discussed the failure rate in the range of 1 in 10,000 and the important necessity to have a follow-up in about 8 weeks after the original procedure to be sure that the semen specimen is free of spermatozoa, thus ensuring sterility.  I discussed the various techniques out there including a 1 incision, 2 incision technique as well.  They understand that we will be giving local anesthesia with Valium the night before and the morning of and a mild antibiotic to take in the immediate perioperative period.  If he cannot tolerate the anesthesia for a variety of reasons including body habitus., we are glad to perform it under an anesthetic.  We discussed the technique of reversal when indicated including the approximate rate of 57% and the importance that this is strongly related to the time from the original vasectomy.  However, I stressed the fact that if they are even considering children in the future they should not use this as a form of temporary contraception patients can stop using contraception 1  postvasectomy semen specimens show azoospermia or only rare nonmotile spermatozoa in the range of less than 100,000 sperm per mL  Narcotic pain medication-patient has significant acute pain that I believe would be an indication for the use of narcotic pain medication.  I discussed the significant risks of pain medication and the fact that this will be a short only option and I will give her no more than a three-day supply of pain medication, I will not plan long-term medication, and that this will be sent to a pain clinic if it at all becomes necessary.  We discussed signing a pain medication agreement and the fact that we're going to run a state NATTY review to be sure the patient is not getting pain medication from elsewhere.  If this is the case, we will not give pain medication as part of the patient's treatment plan of there being prescribed a controlled substance with potential for abuse.  This patient has been well aware of the appropriate dose of such medications including the risks for somnolence, limited ability to drive and/or safety and the significant potential for overdose.  It has been made clear that these medications are for the prescribed patient only without concomitant use of alcohol or other substance unless prescribed by the medical provider.  Has completed prescribing agreement detailing the terms of continue prescribing him a controlled substance including monitoring Natty reports, the possibility of urine drug screens, and pill counts.  The patient is aware that we review NATTY reports on a regular basis and scan them into the chart.  History and physical examination exhibited continued safe and appropriate use of controlled substances. We also discussed the fact that the new [place] legislation allows only a three-day prescription for pain medication.  In this situation he will be referred to a chronic pain clinic.            This document has been electronically signed by STANLEY  MD ELIA June 12, 2025 08:37 EDT    Dictated Utilizing Dragon Dictation: Part of this note may be an electronic transcription/translation of spoken language to printed text using the Dragon Dictation System.

## 2025-06-12 NOTE — PATIENT INSTRUCTIONS
Vasectomy, Care After  Refer to this sheet in the next few weeks. These instructions provide you with information on caring for yourself after your procedure. Your health care provider may also give you more specific instructions. Your treatment has been planned according to current medical practices, but problems sometimes occur. Call your health care provider if you have any problems or questions after your procedure.    What can I expect after the procedure?  After your procedure, it is typical to have the following:  Slight swelling or redness or both at the surgical site.  Mild pain or discomfort in the scrotum.  Some oozing of blood from the cuts (incisions) made by the surgeon is normal during the first day or two after the procedure.  Blood in the ejaculate is common and typically clears after a few days.    Follow these instructions at home:  Only take over-the-counter or prescription medicines for pain, discomfort, or fever as directed by your health care provider.  Avoid using nonsteroidal anti-inflammatory drugs (NSAIDs) because these can make bleeding worse.  Apply ice to the injured area:  Put ice in a plastic bag.  Place a towel between your skin and the bag.  Leave the ice on for 20 minutes, 2-3 times a day.  Avoid being active for the first 2 days after surgery.  Wear a supporter while moving around for the first week after surgery. You may add some sterile fluffed bandages or a clean washcloth to the scrotal support if the scrotal support irritates your skin.  Do not participate in sports or perform heavy physical labor for at least 2 weeks.  You may have protected intercourse 7-10 days after your procedure. Remember, you are not sterile until follow-up specimens show no sperm in your ejaculate.  Be sure to follow up with your surgeon as instructed to confirm sterility. It usually requires multiple (20 - 30) ejaculations to clear the sperm located beyond the vasectomy site of blockage. You will  need at least two specimens showing an absence of sperm before you can resume unprotected intercourse.    Contact a health care provider if:  You have redness, swelling, or increasing pain in the wounds or testicles (scrotum).  You see pus coming from the wound.  You have a fever.  You notice a foul smell coming from the wound or dressing.  You notice a breaking open of the stitches (suture) line or wound edges even after sutures have been removed.  You have increased bleeding from the wounds.    Get help right away if:  You develop a rash.  You have difficulty breathing.  You have any reaction or side effects to medicines given.               Semen Analysis Lab Instructions - After Vasectomy     Follow these instructions at home:   4-6 weeks after vasectomy or approximately 20 - 30 ejaculations.   Semen specimen must be collected in sterile container provided by our office.  You must have your Name and Date of Birth legibly printed on the sterile container.  Sample must be dropped off at [facility] no more than 30 minutes after sample collected.     This information is not intended to replace advice given to you by your health care provider. Make sure you discuss any questions you have with your health care provider.  Document Released: 07/07/2006 Document Revised: 05/25/2017 Document Reviewed: 07/07/2014  [employer] Interactive Patient Education © 2018 [employer]

## (undated) DEVICE — Device: Brand: DEFENDO AIR/WATER/SUCTION AND BIOPSY VALVE

## (undated) DEVICE — SINGLE PORT MANIFOLD: Brand: NEPTUNE 2

## (undated) DEVICE — THE BITE BLOCK MAXI, LATEX FREE STRAP IS USED TO PROTECT THE ENDOSCOPE INSERTION TUBE FROM BEING BITTEN BY THE PATIENT.

## (undated) DEVICE — Device